# Patient Record
Sex: MALE | Race: WHITE | NOT HISPANIC OR LATINO
[De-identification: names, ages, dates, MRNs, and addresses within clinical notes are randomized per-mention and may not be internally consistent; named-entity substitution may affect disease eponyms.]

---

## 2017-01-26 ENCOUNTER — APPOINTMENT (OUTPATIENT)
Dept: CARDIOLOGY | Facility: CLINIC | Age: 62
End: 2017-01-26

## 2017-02-09 ENCOUNTER — APPOINTMENT (OUTPATIENT)
Dept: CARDIOLOGY | Facility: CLINIC | Age: 62
End: 2017-02-09

## 2017-02-16 ENCOUNTER — APPOINTMENT (OUTPATIENT)
Dept: CARDIOLOGY | Facility: CLINIC | Age: 62
End: 2017-02-16

## 2017-05-12 ENCOUNTER — APPOINTMENT (OUTPATIENT)
Dept: FAMILY MEDICINE | Facility: CLINIC | Age: 62
End: 2017-05-12

## 2017-05-12 VITALS
OXYGEN SATURATION: 98 % | HEART RATE: 80 BPM | RESPIRATION RATE: 16 BRPM | HEIGHT: 71 IN | DIASTOLIC BLOOD PRESSURE: 70 MMHG | TEMPERATURE: 98.5 F | SYSTOLIC BLOOD PRESSURE: 128 MMHG | WEIGHT: 195 LBS | BODY MASS INDEX: 27.3 KG/M2

## 2017-05-13 ENCOUNTER — TRANSCRIPTION ENCOUNTER (OUTPATIENT)
Age: 62
End: 2017-05-13

## 2017-05-15 LAB
ALBUMIN SERPL ELPH-MCNC: 4.8 G/DL
ALP BLD-CCNC: 79 U/L
ALT SERPL-CCNC: 20 U/L
ANION GAP SERPL CALC-SCNC: 15 MMOL/L
APPEARANCE: CLEAR
AST SERPL-CCNC: 20 U/L
BACTERIA: NEGATIVE
BASOPHILS # BLD AUTO: 0.03 K/UL
BASOPHILS NFR BLD AUTO: 0.5 %
BILIRUB SERPL-MCNC: 0.7 MG/DL
BILIRUBIN URINE: NEGATIVE
BLOOD URINE: NEGATIVE
BUN SERPL-MCNC: 22 MG/DL
CALCIUM SERPL-MCNC: 9.5 MG/DL
CHLORIDE SERPL-SCNC: 103 MMOL/L
CHOLEST SERPL-MCNC: 196 MG/DL
CHOLEST/HDLC SERPL: 3 RATIO
CO2 SERPL-SCNC: 25 MMOL/L
COLOR: YELLOW
CREAT SERPL-MCNC: 1.09 MG/DL
CREAT SPEC-SCNC: 103 MG/DL
EOSINOPHIL # BLD AUTO: 0.16 K/UL
EOSINOPHIL NFR BLD AUTO: 2.5 %
GLUCOSE QUALITATIVE U: NORMAL MG/DL
GLUCOSE SERPL-MCNC: 84 MG/DL
HBA1C MFR BLD HPLC: 4.4 %
HCT VFR BLD CALC: 43.7 %
HDLC SERPL-MCNC: 58 MG/DL
HEMOCCULT STL QL: NEGATIVE
HGB BLD-MCNC: 15.8 G/DL
IMM GRANULOCYTES NFR BLD AUTO: 0.2 %
KETONES URINE: NEGATIVE
LDLC SERPL CALC-MCNC: 112 MG/DL
LEUKOCYTE ESTERASE URINE: NEGATIVE
LYMPHOCYTES # BLD AUTO: 1.26 K/UL
LYMPHOCYTES NFR BLD AUTO: 19.8 %
MAN DIFF?: NORMAL
MCHC RBC-ENTMCNC: 32.4 PG
MCHC RBC-ENTMCNC: 36.2 GM/DL
MCV RBC AUTO: 89.7 FL
MICROALBUMIN 24H UR DL<=1MG/L-MCNC: <0.3 MG/DL
MICROALBUMIN/CREAT 24H UR-RTO: NORMAL
MICROSCOPIC-UA: NORMAL
MONOCYTES # BLD AUTO: 0.36 K/UL
MONOCYTES NFR BLD AUTO: 5.7 %
NEUTROPHILS # BLD AUTO: 4.54 K/UL
NEUTROPHILS NFR BLD AUTO: 71.3 %
NITRITE URINE: NEGATIVE
PH URINE: 7
PLATELET # BLD AUTO: 214 K/UL
POTASSIUM SERPL-SCNC: 4.1 MMOL/L
PROT SERPL-MCNC: 7.2 G/DL
PROTEIN URINE: NEGATIVE MG/DL
PSA SERPL-MCNC: 0.8 NG/ML
RBC # BLD: 4.87 M/UL
RBC # FLD: 14 %
RED BLOOD CELLS URINE: 3 /HPF
SODIUM SERPL-SCNC: 143 MMOL/L
SPECIFIC GRAVITY URINE: 1.02
SQUAMOUS EPITHELIAL CELLS: 0 /HPF
T4 FREE SERPL-MCNC: 1.2 NG/DL
TRIGL SERPL-MCNC: 130 MG/DL
TSH SERPL-ACNC: 2.02 UIU/ML
UROBILINOGEN URINE: NORMAL MG/DL
WBC # FLD AUTO: 6.36 K/UL
WHITE BLOOD CELLS URINE: 0 /HPF

## 2017-11-26 ENCOUNTER — EMERGENCY (EMERGENCY)
Facility: HOSPITAL | Age: 62
LOS: 1 days | Discharge: HOME | End: 2017-11-26
Admitting: EMERGENCY MEDICINE
Payer: COMMERCIAL

## 2017-11-26 PROCEDURE — 82550 ASSAY OF CK (CPK): CPT

## 2017-11-26 PROCEDURE — 80048 BASIC METABOLIC PNL TOTAL CA: CPT

## 2017-11-26 PROCEDURE — 36415 COLL VENOUS BLD VENIPUNCTURE: CPT

## 2017-11-26 PROCEDURE — 99283 EMERGENCY DEPT VISIT LOW MDM: CPT | Mod: 25

## 2017-11-26 PROCEDURE — 84484 ASSAY OF TROPONIN QUANT: CPT

## 2017-11-26 PROCEDURE — 93010 ELECTROCARDIOGRAM REPORT: CPT

## 2017-11-26 PROCEDURE — 82553 CREATINE MB FRACTION: CPT

## 2017-11-26 PROCEDURE — 85027 COMPLETE CBC AUTOMATED: CPT

## 2017-11-26 PROCEDURE — 99285 EMERGENCY DEPT VISIT HI MDM: CPT

## 2017-11-26 PROCEDURE — 93005 ELECTROCARDIOGRAM TRACING: CPT

## 2017-12-05 ENCOUNTER — APPOINTMENT (OUTPATIENT)
Dept: FAMILY MEDICINE | Facility: CLINIC | Age: 62
End: 2017-12-05
Payer: COMMERCIAL

## 2017-12-05 VITALS
HEART RATE: 72 BPM | HEIGHT: 71 IN | OXYGEN SATURATION: 98 % | SYSTOLIC BLOOD PRESSURE: 138 MMHG | DIASTOLIC BLOOD PRESSURE: 84 MMHG | BODY MASS INDEX: 27.3 KG/M2 | WEIGHT: 195 LBS

## 2017-12-05 DIAGNOSIS — G47.00 INSOMNIA, UNSPECIFIED: ICD-10-CM

## 2017-12-05 PROCEDURE — 99214 OFFICE O/P EST MOD 30 MIN: CPT

## 2018-03-18 ENCOUNTER — FORM ENCOUNTER (OUTPATIENT)
Age: 63
End: 2018-03-18

## 2018-03-19 ENCOUNTER — APPOINTMENT (OUTPATIENT)
Dept: RADIOLOGY | Facility: HOSPITAL | Age: 63
End: 2018-03-19
Payer: COMMERCIAL

## 2018-03-19 ENCOUNTER — OUTPATIENT (OUTPATIENT)
Dept: OUTPATIENT SERVICES | Facility: HOSPITAL | Age: 63
LOS: 1 days | End: 2018-03-19
Payer: COMMERCIAL

## 2018-03-19 ENCOUNTER — APPOINTMENT (OUTPATIENT)
Dept: FAMILY MEDICINE | Facility: CLINIC | Age: 63
End: 2018-03-19
Payer: COMMERCIAL

## 2018-03-19 VITALS
HEART RATE: 75 BPM | DIASTOLIC BLOOD PRESSURE: 82 MMHG | OXYGEN SATURATION: 97 % | HEIGHT: 71 IN | BODY MASS INDEX: 27.3 KG/M2 | SYSTOLIC BLOOD PRESSURE: 128 MMHG | WEIGHT: 195 LBS

## 2018-03-19 VITALS — RESPIRATION RATE: 14 BRPM

## 2018-03-19 VITALS — RESPIRATION RATE: 16 BRPM

## 2018-03-19 DIAGNOSIS — M79.606 PAIN IN LEG, UNSPECIFIED: ICD-10-CM

## 2018-03-19 DIAGNOSIS — M25.552 PAIN IN RIGHT HIP: ICD-10-CM

## 2018-03-19 DIAGNOSIS — M25.551 PAIN IN RIGHT HIP: ICD-10-CM

## 2018-03-19 DIAGNOSIS — M25.552 PAIN IN LEFT HIP: ICD-10-CM

## 2018-03-19 PROCEDURE — 73521 X-RAY EXAM HIPS BI 2 VIEWS: CPT

## 2018-03-19 PROCEDURE — 99213 OFFICE O/P EST LOW 20 MIN: CPT

## 2018-03-19 PROCEDURE — 73521 X-RAY EXAM HIPS BI 2 VIEWS: CPT | Mod: 26

## 2018-03-21 ENCOUNTER — MEDICATION RENEWAL (OUTPATIENT)
Age: 63
End: 2018-03-21

## 2018-05-13 ENCOUNTER — RX RENEWAL (OUTPATIENT)
Age: 63
End: 2018-05-13

## 2018-05-13 DIAGNOSIS — Z87.09 PERSONAL HISTORY OF OTHER DISEASES OF THE RESPIRATORY SYSTEM: ICD-10-CM

## 2019-04-08 ENCOUNTER — OTHER (OUTPATIENT)
Age: 64
End: 2019-04-08

## 2019-04-08 DIAGNOSIS — Z20.828 CONTACT WITH AND (SUSPECTED) EXPOSURE TO OTHER VIRAL COMMUNICABLE DISEASES: ICD-10-CM

## 2019-07-26 ENCOUNTER — TRANSCRIPTION ENCOUNTER (OUTPATIENT)
Age: 64
End: 2019-07-26

## 2019-09-02 ENCOUNTER — TRANSCRIPTION ENCOUNTER (OUTPATIENT)
Age: 64
End: 2019-09-02

## 2020-05-09 ENCOUNTER — APPOINTMENT (OUTPATIENT)
Dept: FAMILY MEDICINE | Facility: CLINIC | Age: 65
End: 2020-05-09

## 2020-12-16 PROBLEM — Z87.09 HISTORY OF ACUTE SINUSITIS: Status: RESOLVED | Noted: 2018-05-13 | Resolved: 2020-12-16

## 2021-06-22 ENCOUNTER — NON-APPOINTMENT (OUTPATIENT)
Age: 66
End: 2021-06-22

## 2021-08-26 ENCOUNTER — APPOINTMENT (OUTPATIENT)
Dept: FAMILY MEDICINE | Facility: CLINIC | Age: 66
End: 2021-08-26
Payer: MEDICARE

## 2021-08-26 ENCOUNTER — NON-APPOINTMENT (OUTPATIENT)
Age: 66
End: 2021-08-26

## 2021-08-26 VITALS
OXYGEN SATURATION: 98 % | SYSTOLIC BLOOD PRESSURE: 152 MMHG | WEIGHT: 205 LBS | TEMPERATURE: 97.1 F | DIASTOLIC BLOOD PRESSURE: 82 MMHG | BODY MASS INDEX: 28.7 KG/M2 | HEIGHT: 71 IN | HEART RATE: 77 BPM | RESPIRATION RATE: 14 BRPM

## 2021-08-26 DIAGNOSIS — Z12.11 ENCOUNTER FOR SCREENING FOR MALIGNANT NEOPLASM OF COLON: ICD-10-CM

## 2021-08-26 PROCEDURE — 36415 COLL VENOUS BLD VENIPUNCTURE: CPT

## 2021-08-26 PROCEDURE — 93000 ELECTROCARDIOGRAM COMPLETE: CPT | Mod: 59

## 2021-08-26 PROCEDURE — G0402 INITIAL PREVENTIVE EXAM: CPT

## 2021-08-26 NOTE — ASSESSMENT
[FreeTextEntry1] : Assessment and plan:\par \par 1.  Comprehensive annual wellness exam no acute findings.\par \par 2.  Electrocardiogram shows no acute ST-T wave changes no signs of ischemia when compared to previous stable.\par \par 3.  Comprehensive blood work drawn in office by examiner.\par \par 4.  Discussed with patient health maintenance issues patient is up-to-date with COVID-19 vaccination, referrals given for cardiology follow-up and for colon cancer screening colonoscopy fecal occult blood immunology will also be checked.\par \par 5.  Hypertension without diagnosis of hypertension recommend home blood pressure monitoring repeat blood pressure did drop to 140/80, discussed with patient the possibilities of starting medications to keep his blood pressure well controlled patient prefers conservative management lifestyle changes therefore I recommend weight loss program, increase physical activity as tolerated, low-sodium diet and maintain home blood pressure log.\par \par 6.  Vitiligo no changes stable.

## 2021-08-26 NOTE — HEALTH RISK ASSESSMENT
[Very Good] : ~his/her~  mood as very good [Yes] : Yes [2 - 3 times a week (3 pts)] : 2 - 3  times a week (3 points) [1 or 2 (0 pts)] : 1 or 2 (0 points) [Never (0 pts)] : Never (0 points) [No] : In the past 12 months have you used drugs other than those required for medical reasons? No [No falls in past year] : Patient reported no falls in the past year [0] : 2) Feeling down, depressed, or hopeless: Not at all (0) [Patient refused screening] : Patient refused screening [PHQ-2 Negative - No further assessment needed] : PHQ-2 Negative - No further assessment needed [Patient reported colonoscopy was normal] : Patient reported colonoscopy was normal [HIV test declined] : HIV test declined [None] : None [With Family] : lives with family [# of Members in Household ___] :  household currently consist of [unfilled] member(s) [Employed] : employed [College] : College [] :  [# Of Children ___] : has [unfilled] children [Sexually Active] : sexually active [Feels Safe at Home] : Feels safe at home [Fully functional (bathing, dressing, toileting, transferring, walking, feeding)] : Fully functional (bathing, dressing, toileting, transferring, walking, feeding) [Fully functional (using the telephone, shopping, preparing meals, housekeeping, doing laundry, using] : Fully functional and needs no help or supervision to perform IADLs (using the telephone, shopping, preparing meals, housekeeping, doing laundry, using transportation, managing medications and managing finances) [Reports normal functional visual acuity (ie: able to read med bottle)] : Reports normal functional visual acuity [Smoke Detector] : smoke detector [Carbon Monoxide Detector] : carbon monoxide detector [Safety elements used in home] : safety elements used in home [Seat Belt] :  uses seat belt [Sunscreen] : uses sunscreen [Reviewed no changes] : Reviewed, no changes [Designated Healthcare Proxy] : Designated healthcare proxy [Relationship: ___] : Relationship: [unfilled] [Aggressive treatment] : aggressive treatment [] : No [Audit-CScore] : 3 [VGC6Lltup] : 0 [Change in mental status noted] : No change in mental status noted [Language] : denies difficulty with language [Behavior] : denies difficulty with behavior [Learning/Retaining New Information] : denies difficulty learning/retaining new information [Handling Complex Tasks] : denies difficulty handling complex tasks [Reasoning] : denies difficulty with reasoning [Spatial Ability and Orientation] : denies difficulty with spatial ability and orientation [High Risk Behavior] : no high risk behavior [Reports changes in hearing] : Reports no changes in hearing [Reports changes in vision] : Reports no changes in vision [Reports changes in dental health] : Reports no changes in dental health [Travel to Developing Areas] : does not  travel to developing areas [TB Exposure] : is not being exposed to tuberculosis [Caregiver Concerns] : does not have caregiver concerns [ColonoscopyDate] : 04/15 [AdvancecareDate] : 08/21

## 2021-08-26 NOTE — PHYSICAL EXAM
[No Acute Distress] : no acute distress [Well Nourished] : well nourished [Well Developed] : well developed [Well-Appearing] : well-appearing [Normal Voice/Communication] : normal voice/communication [Normal Sclera/Conjunctiva] : normal sclera/conjunctiva [PERRL] : pupils equal round and reactive to light [EOMI] : extraocular movements intact [Normal Outer Ear/Nose] : the outer ears and nose were normal in appearance [Normal Oropharynx] : the oropharynx was normal [Normal TMs] : both tympanic membranes were normal [Normal Nasal Mucosa] : the nasal mucosa was normal [No JVD] : no jugular venous distention [No Lymphadenopathy] : no lymphadenopathy [Supple] : supple [Thyroid Normal, No Nodules] : the thyroid was normal and there were no nodules present [No Respiratory Distress] : no respiratory distress  [No Accessory Muscle Use] : no accessory muscle use [Clear to Auscultation] : lungs were clear to auscultation bilaterally [Normal Rate] : normal rate  [Regular Rhythm] : with a regular rhythm [Normal S1, S2] : normal S1 and S2 [No Murmur] : no murmur heard [No Carotid Bruits] : no carotid bruits [No Abdominal Bruit] : a ~M bruit was not heard ~T in the abdomen [No Varicosities] : no varicosities [Pedal Pulses Present] : the pedal pulses are present [No Edema] : there was no peripheral edema [No Palpable Aorta] : no palpable aorta [No Extremity Clubbing/Cyanosis] : no extremity clubbing/cyanosis [Normal Appearance] : normal in appearance [Soft] : abdomen soft [Non Tender] : non-tender [Non-distended] : non-distended [No Masses] : no abdominal mass palpated [No HSM] : no HSM [Normal Bowel Sounds] : normal bowel sounds [Normal Sphincter Tone] : normal sphincter tone [No Mass] : no mass [Urethral Meatus] : meatus normal [Penis Abnormality] : normal circumcised penis [Urinary Bladder Findings] : the bladder was normal on palpation [Scrotum] : the scrotum was normal [Rectal Exam - Seminal Vesicles] : the seminal vesicles were normal [Epididymis] : the epididymides were normal [Testes Tenderness] : no tenderness of the testes [Testes Mass (___cm)] : there were no testicular masses [Anus Abnormality] : the anus and perineum were normal [Rectal Exam - Rectum] : digital rectal exam was normal [Prostate Tenderness] : the prostate was not tender [No Prostate Nodules] : no prostate nodules [Normal Supraclavicular Nodes] : no supraclavicular lymphadenopathy [Normal Posterior Cervical Nodes] : no posterior cervical lymphadenopathy [Normal Anterior Cervical Nodes] : no anterior cervical lymphadenopathy [No CVA Tenderness] : no CVA  tenderness [No Spinal Tenderness] : no spinal tenderness [No Joint Swelling] : no joint swelling [Grossly Normal Strength/Tone] : grossly normal strength/tone [No Rash] : no rash [Coordination Grossly Intact] : coordination grossly intact [No Focal Deficits] : no focal deficits [Normal Gait] : normal gait [Deep Tendon Reflexes (DTR)] : deep tendon reflexes were 2+ and symmetric [Speech Grossly Normal] : speech grossly normal [Memory Grossly Normal] : memory grossly normal [Normal Affect] : the affect was normal [Alert and Oriented x3] : oriented to person, place, and time [Normal Mood] : the mood was normal [Normal Insight/Judgement] : insight and judgment were intact [Comprehensive Foot Exam Normal] : Right and left foot were examined and both feet are normal. No ulcers in either foot. Toes are normal and with full ROM.  Normal tactile sensation with monofilament testing throughout both feet [de-identified] : Vitiligo stable.

## 2021-08-26 NOTE — HISTORY OF PRESENT ILLNESS
[FreeTextEntry1] : See HPI. [de-identified] : Patient is a 66-year-old gentleman who presents today for annual wellness exam patient states that he is been in his usual state of health unfortunately last seen approximately 3 years ago.  Presently patient denies any chest pain, shortness of breath, nausea or vomiting.  We will be obtaining comprehensive blood work, electrocardiogram and will be discussing health maintenance issues.

## 2021-08-26 NOTE — COUNSELING
[Fall prevention counseling provided] : Fall prevention counseling provided [Adequate lighting] : Adequate lighting [No throw rugs] : No throw rugs [Use proper foot wear] : Use proper foot wear [Behavioral health counseling provided] : Behavioral health counseling provided [Sleep ___ hours/day] : Sleep [unfilled] hours/day [AUDIT-C Screening administered and reviewed] : AUDIT-C Screening administered and reviewed [Potential consequences of obesity discussed] : Potential consequences of obesity discussed [Benefits of weight loss discussed] : Benefits of weight loss discussed [Structured Weight Management Program suggested:] : Structured weight management program suggested [Encouraged to maintain food diary] : Encouraged to maintain food diary [Encouraged to increase physical activity] : Encouraged to increase physical activity [Encouraged to use exercise tracking device] : Encouraged to use exercise tracking device [Target Wt Loss Goal ___] : Weight Loss Goals: Target weight loss goal [unfilled] lbs [Decrease Portions] : decrease portions [Weigh Self Weekly] : weigh self weekly [____ min/wk Activity] : [unfilled] min/wk activity [Keep Food Diary] : keep food diary [None] : None [Good understanding] : Patient has a good understanding of lifestyle changes and steps needed to achieve self management goal

## 2021-08-30 LAB
ALBUMIN SERPL ELPH-MCNC: 4.7 G/DL
ALP BLD-CCNC: 79 U/L
ALT SERPL-CCNC: 15 U/L
ANION GAP SERPL CALC-SCNC: 14 MMOL/L
APPEARANCE: CLEAR
AST SERPL-CCNC: 18 U/L
BACTERIA: NEGATIVE
BASOPHILS # BLD AUTO: 0.04 K/UL
BASOPHILS NFR BLD AUTO: 0.6 %
BILIRUB SERPL-MCNC: 1.1 MG/DL
BILIRUBIN URINE: NEGATIVE
BLOOD URINE: NEGATIVE
BUN SERPL-MCNC: 21 MG/DL
CALCIUM SERPL-MCNC: 9.2 MG/DL
CHLORIDE SERPL-SCNC: 107 MMOL/L
CHOLEST SERPL-MCNC: 207 MG/DL
CO2 SERPL-SCNC: 22 MMOL/L
COLOR: YELLOW
COVID-19 NUCLEOCAPSID  GAM ANTIBODY INTERPRETATION: NEGATIVE
COVID-19 SPIKE DOMAIN ANTIBODY INTERPRETATION: POSITIVE
CREAT SERPL-MCNC: 1.05 MG/DL
EOSINOPHIL # BLD AUTO: 0.08 K/UL
EOSINOPHIL NFR BLD AUTO: 1.3 %
ESTIMATED AVERAGE GLUCOSE: 82 MG/DL
GLUCOSE QUALITATIVE U: NEGATIVE
GLUCOSE SERPL-MCNC: 97 MG/DL
HBA1C MFR BLD HPLC: 4.5 %
HCT VFR BLD CALC: 45.3 %
HCV AB SER QL: NONREACTIVE
HCV S/CO RATIO: 0.06 S/CO
HDLC SERPL-MCNC: 54 MG/DL
HGB BLD-MCNC: 15.4 G/DL
HYALINE CASTS: 0 /LPF
IMM GRANULOCYTES NFR BLD AUTO: 0.3 %
KETONES URINE: NEGATIVE
LDLC SERPL CALC-MCNC: 136 MG/DL
LEUKOCYTE ESTERASE URINE: NEGATIVE
LYMPHOCYTES # BLD AUTO: 1.05 K/UL
LYMPHOCYTES NFR BLD AUTO: 16.5 %
MAN DIFF?: NORMAL
MCHC RBC-ENTMCNC: 30.9 PG
MCHC RBC-ENTMCNC: 34 GM/DL
MCV RBC AUTO: 91 FL
MICROSCOPIC-UA: NORMAL
MONOCYTES # BLD AUTO: 0.45 K/UL
MONOCYTES NFR BLD AUTO: 7.1 %
NEUTROPHILS # BLD AUTO: 4.71 K/UL
NEUTROPHILS NFR BLD AUTO: 74.2 %
NITRITE URINE: NEGATIVE
NONHDLC SERPL-MCNC: 154 MG/DL
PH URINE: 6
PLATELET # BLD AUTO: 207 K/UL
POTASSIUM SERPL-SCNC: 4.1 MMOL/L
PROT SERPL-MCNC: 6.7 G/DL
PROTEIN URINE: NEGATIVE
PSA SERPL-MCNC: 0.87 NG/ML
RBC # BLD: 4.98 M/UL
RBC # FLD: 13.8 %
RED BLOOD CELLS URINE: 0 /HPF
SARS-COV-2 AB SERPL IA-ACNC: >250 U/ML
SARS-COV-2 AB SERPL QL IA: 0.08 INDEX
SODIUM SERPL-SCNC: 142 MMOL/L
SPECIFIC GRAVITY URINE: 1.02
SQUAMOUS EPITHELIAL CELLS: 0 /HPF
T4 FREE SERPL-MCNC: 1.1 NG/DL
TRIGL SERPL-MCNC: 89 MG/DL
UROBILINOGEN URINE: NORMAL
WBC # FLD AUTO: 6.35 K/UL
WHITE BLOOD CELLS URINE: 0 /HPF

## 2021-10-22 ENCOUNTER — TRANSCRIPTION ENCOUNTER (OUTPATIENT)
Age: 66
End: 2021-10-22

## 2021-10-23 ENCOUNTER — TRANSCRIPTION ENCOUNTER (OUTPATIENT)
Age: 66
End: 2021-10-23

## 2021-10-23 DIAGNOSIS — R03.0 ELEVATED BLOOD-PRESSURE READING, W/OUT DIAGNOSIS OF HYPERTENSION: ICD-10-CM

## 2021-10-27 ENCOUNTER — TRANSCRIPTION ENCOUNTER (OUTPATIENT)
Age: 66
End: 2021-10-27

## 2021-10-28 ENCOUNTER — NON-APPOINTMENT (OUTPATIENT)
Age: 66
End: 2021-10-28

## 2021-10-29 ENCOUNTER — APPOINTMENT (OUTPATIENT)
Dept: CARDIOLOGY | Facility: CLINIC | Age: 66
End: 2021-10-29
Payer: MEDICARE

## 2021-10-29 ENCOUNTER — NON-APPOINTMENT (OUTPATIENT)
Age: 66
End: 2021-10-29

## 2021-10-29 VITALS
HEIGHT: 71 IN | TEMPERATURE: 97.8 F | SYSTOLIC BLOOD PRESSURE: 128 MMHG | OXYGEN SATURATION: 97 % | BODY MASS INDEX: 28.42 KG/M2 | RESPIRATION RATE: 16 BRPM | HEART RATE: 70 BPM | WEIGHT: 203 LBS | DIASTOLIC BLOOD PRESSURE: 68 MMHG

## 2021-10-29 VITALS — DIASTOLIC BLOOD PRESSURE: 80 MMHG | SYSTOLIC BLOOD PRESSURE: 128 MMHG

## 2021-10-29 PROCEDURE — 93000 ELECTROCARDIOGRAM COMPLETE: CPT

## 2021-10-29 PROCEDURE — 93306 TTE W/DOPPLER COMPLETE: CPT

## 2021-10-29 PROCEDURE — 99204 OFFICE O/P NEW MOD 45 MIN: CPT

## 2021-10-29 NOTE — ASSESSMENT
[FreeTextEntry1] : Elevated blood pressure now reasonably well controlled.  Home blood pressure machine measures about 10 mm systolic higher than office.  Mild overweight.  Elevated cardiac risk.

## 2021-10-29 NOTE — HISTORY OF PRESENT ILLNESS
[FreeTextEntry1] : Patient has had very stressful situation at work blood pressures have been considerably elevated 160-180 systolic without symptoms of headache chest pain or shortness of breath.  Home blood pressure measurements were elevated and he was initiated on therapy which he is taking.  He is cutting salt in diet and starting to do a little walking.\par \par He has no personal prior cardiac history denies chest pain or dyspnea.

## 2021-10-29 NOTE — DISCUSSION/SUMMARY
[Patient] : the patient [Risks] : risks [Benefits] : benefits [Alternatives] : alternatives [FreeTextEntry1] : Detailed discussion with patient and wife.  Counseled on–diet low-sodium diet exercise weight loss.  Echo today to be arranged.  Treadmill test to assess blood pressure response to exercise and risk stratify also coronary calcium score recommended.  Follow-up after above for further discussion.  Questions addressed with patient and wife.  Blood work reviewed and discussed.

## 2021-11-10 ENCOUNTER — OUTPATIENT (OUTPATIENT)
Dept: OUTPATIENT SERVICES | Facility: HOSPITAL | Age: 66
LOS: 1 days | End: 2021-11-10
Payer: SELF-PAY

## 2021-11-10 ENCOUNTER — APPOINTMENT (OUTPATIENT)
Dept: CT IMAGING | Facility: CLINIC | Age: 66
End: 2021-11-10
Payer: SELF-PAY

## 2021-11-10 DIAGNOSIS — Z00.00 ENCOUNTER FOR GENERAL ADULT MEDICAL EXAMINATION WITHOUT ABNORMAL FINDINGS: ICD-10-CM

## 2021-11-10 PROCEDURE — 75571 CT HRT W/O DYE W/CA TEST: CPT | Mod: ME

## 2021-11-10 PROCEDURE — G1004: CPT

## 2021-11-10 PROCEDURE — 75571 CT HRT W/O DYE W/CA TEST: CPT | Mod: 26,ME

## 2021-11-12 ENCOUNTER — APPOINTMENT (OUTPATIENT)
Dept: CT IMAGING | Facility: CLINIC | Age: 66
End: 2021-11-12

## 2021-11-12 ENCOUNTER — APPOINTMENT (OUTPATIENT)
Dept: CARDIOLOGY | Facility: CLINIC | Age: 66
End: 2021-11-12
Payer: MEDICARE

## 2021-11-12 PROCEDURE — 93015 CV STRESS TEST SUPVJ I&R: CPT

## 2021-12-07 ENCOUNTER — APPOINTMENT (OUTPATIENT)
Dept: FAMILY MEDICINE | Facility: CLINIC | Age: 66
End: 2021-12-07

## 2021-12-22 DIAGNOSIS — B00.1 HERPESVIRAL VESICULAR DERMATITIS: ICD-10-CM

## 2021-12-27 ENCOUNTER — APPOINTMENT (OUTPATIENT)
Dept: CT IMAGING | Facility: CLINIC | Age: 66
End: 2021-12-27
Payer: MEDICARE

## 2021-12-27 ENCOUNTER — OUTPATIENT (OUTPATIENT)
Dept: OUTPATIENT SERVICES | Facility: HOSPITAL | Age: 66
LOS: 1 days | End: 2021-12-27
Payer: MEDICARE

## 2021-12-27 DIAGNOSIS — R93.89 ABNORMAL FINDINGS ON DIAGNOSTIC IMAGING OF OTHER SPECIFIED BODY STRUCTURES: ICD-10-CM

## 2021-12-27 PROCEDURE — G1004: CPT

## 2021-12-27 PROCEDURE — 71250 CT THORAX DX C-: CPT | Mod: ME

## 2021-12-27 PROCEDURE — 71250 CT THORAX DX C-: CPT | Mod: 26,ME

## 2022-01-01 ENCOUNTER — NON-APPOINTMENT (OUTPATIENT)
Age: 67
End: 2022-01-01

## 2022-01-11 ENCOUNTER — NON-APPOINTMENT (OUTPATIENT)
Age: 67
End: 2022-01-11

## 2022-01-12 ENCOUNTER — APPOINTMENT (OUTPATIENT)
Dept: FAMILY MEDICINE | Facility: CLINIC | Age: 67
End: 2022-01-12
Payer: MEDICARE

## 2022-01-12 VITALS
RESPIRATION RATE: 16 BRPM | OXYGEN SATURATION: 99 % | WEIGHT: 203 LBS | HEIGHT: 71 IN | SYSTOLIC BLOOD PRESSURE: 134 MMHG | DIASTOLIC BLOOD PRESSURE: 71 MMHG | BODY MASS INDEX: 28.42 KG/M2 | HEART RATE: 71 BPM | TEMPERATURE: 97.3 F

## 2022-01-12 DIAGNOSIS — Z01.812 ENCOUNTER FOR PREPROCEDURAL LABORATORY EXAMINATION: ICD-10-CM

## 2022-01-12 PROCEDURE — 99214 OFFICE O/P EST MOD 30 MIN: CPT

## 2022-01-12 NOTE — ASSESSMENT
[FreeTextEntry1] : Assessment and plan:\par \par 1.  Incidental finding lung nodules reviewed CT scan of chest for completeness sake I recommend pulmonology evaluation.  Most likely will repeat CT scan in 6 to 12 months to document stability.\par \par 2.  Cough physical exam was 100% within normal limits no acute findings it is only at night we could also consider the possibility of ACE inhibitor induced cough but usually that is fastidious and is present all day.  The cough basically lasts only minutes does not last all night and quickly resolves.\par \par 3.  Hypertension excellent blood pressure control with present medical management continue lisinopril with amlodipine and as stated earlier if cough worsens becomes fastidious I will discontinue lisinopril.\par \par 4.  Hyperlipidemia diet controlled low-fat low-cholesterol diet.\par \par Total time spent 35 minutes greater than 50% of that time was spent on counseling and coordination of care regarding lung nodules.

## 2022-01-12 NOTE — HISTORY OF PRESENT ILLNESS
[FreeTextEntry1] : See HPI [de-identified] : Patient is a 66-year-old gentleman who presents today for follow-up and disease management recently incidental finding lung nodules status post CT angio of chest for calcium score repeat CT scan of chest shows very small basically insignificant type nodules.  Patient complains of cough only at night presently denies any chest pain, shortness of breath, nausea or vomiting recently had comprehensive cardiac work-up which was absolutely normal.  Medical history is also significant for hypertension and mild hyperlipidemia which is diet controlled.

## 2022-01-12 NOTE — HEALTH RISK ASSESSMENT
[Never] : Never [Yes] : Yes [2 - 4 times a month (2 pts)] : 2-4 times a month (2 points) [1 or 2 (0 pts)] : 1 or 2 (0 points) [Never (0 pts)] : Never (0 points) [No] : In the past 12 months have you used drugs other than those required for medical reasons? No [No falls in past year] : Patient reported no falls in the past year [0] : 2) Feeling down, depressed, or hopeless: Not at all (0) [PHQ-2 Negative - No further assessment needed] : PHQ-2 Negative - No further assessment needed [Audit-CScore] : 2 [[id number]] : 0 [With Patient/Caregiver] : , with patient/caregiver [Reviewed no changes] : Reviewed, no changes [Designated Healthcare Proxy] : Designated healthcare proxy [Relationship: ___] : Relationship: [unfilled] [Aggressive treatment] : aggressive treatment [I will adhere to the patient's wishes.] : I will adhere to the patient's wishes. [AdvancecareDate] : 01/22

## 2022-01-12 NOTE — REVIEW OF SYSTEMS
[Chest Pain] : no chest pain [Palpitations] : no palpitations [Claudication] : no  leg claudication [Lower Ext Edema] : no lower extremity edema [Orthopena] : no orthopnea [Paroxysmal Nocturnal Dyspnea] : no paroxysmal nocturnal dyspnea [Shortness Of Breath] : no shortness of breath [Wheezing] : no wheezing [Cough] : cough [Dyspnea on Exertion] : not dyspnea on exertion [Negative] : Heme/Lymph

## 2022-01-30 ENCOUNTER — TRANSCRIPTION ENCOUNTER (OUTPATIENT)
Age: 67
End: 2022-01-30

## 2022-01-31 ENCOUNTER — APPOINTMENT (OUTPATIENT)
Dept: FAMILY MEDICINE | Facility: CLINIC | Age: 67
End: 2022-01-31
Payer: MEDICARE

## 2022-01-31 DIAGNOSIS — Z11.59 ENCOUNTER FOR SCREENING FOR OTHER VIRAL DISEASES: ICD-10-CM

## 2022-01-31 DIAGNOSIS — R05.8 OTHER SPECIFIED COUGH: ICD-10-CM

## 2022-01-31 PROCEDURE — 99215 OFFICE O/P EST HI 40 MIN: CPT | Mod: 95

## 2022-01-31 RX ORDER — LISINOPRIL 10 MG/1
10 TABLET ORAL
Qty: 30 | Refills: 3 | Status: DISCONTINUED | COMMUNITY
Start: 2021-10-27 | End: 2022-01-31

## 2022-01-31 NOTE — REVIEW OF SYSTEMS
[Fever] : fever [Cough] : cough [Negative] : Heme/Lymph [Shortness Of Breath] : no shortness of breath [Wheezing] : no wheezing [Dyspnea on Exertion] : not dyspnea on exertion [FreeTextEntry2] : subjective fever

## 2022-01-31 NOTE — ASSESSMENT
[FreeTextEntry1] : continue with symptomatic management, plenty of rest, warm fluids, hand washing, salt water gargle, for now, Flonase, benzonatate,  tylenol for fever, if symptoms worsen, new onset, fever, chills, return to office for re-evaluation, may consider getting COVID-19 PCR swab tested. added COVID-19 PCR swab today\par follow up with pulmonary accordingly\par Held Lisinopril for now, titrate dose of Amlodipine to 10 mg daily, keep home bp logs\par RTO for follow up and bp check with PMD.\par Greater than 55 min of face to face time, reviewed chart, labwork, addressed various health concerns, ordered necessary labs, imaging, referral for follow up. Answered all pt questions, coordinated care for patient.\par \par

## 2022-01-31 NOTE — PHYSICAL EXAM
[No Acute Distress] : no acute distress [Well Nourished] : well nourished [EOMI] : extraocular movements intact [Normal Outer Ear/Nose] : the outer ears and nose were normal in appearance [No Respiratory Distress] : no respiratory distress  [No Edema] : there was no peripheral edema [Non Tender] : non-tender [No Rash] : no rash [Normal Gait] : normal gait [Normal Affect] : the affect was normal
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
## 2022-02-01 ENCOUNTER — TRANSCRIPTION ENCOUNTER (OUTPATIENT)
Age: 67
End: 2022-02-01

## 2022-02-01 LAB — SARS-COV-2 N GENE NPH QL NAA+PROBE: DETECTED

## 2022-02-08 ENCOUNTER — APPOINTMENT (OUTPATIENT)
Dept: FAMILY MEDICINE | Facility: CLINIC | Age: 67
End: 2022-02-08
Payer: MEDICARE

## 2022-02-08 VITALS
RESPIRATION RATE: 15 BRPM | HEART RATE: 88 BPM | BODY MASS INDEX: 28.38 KG/M2 | HEIGHT: 71.3 IN | OXYGEN SATURATION: 97 % | WEIGHT: 205 LBS | TEMPERATURE: 97.3 F

## 2022-02-08 VITALS — SYSTOLIC BLOOD PRESSURE: 150 MMHG | DIASTOLIC BLOOD PRESSURE: 80 MMHG

## 2022-02-08 DIAGNOSIS — R09.81 NASAL CONGESTION: ICD-10-CM

## 2022-02-08 PROCEDURE — 99214 OFFICE O/P EST MOD 30 MIN: CPT | Mod: CS

## 2022-02-08 RX ORDER — ALPRAZOLAM 0.25 MG/1
0.25 TABLET ORAL
Qty: 20 | Refills: 0 | Status: DISCONTINUED | COMMUNITY
Start: 2017-12-05 | End: 2022-02-08

## 2022-02-08 NOTE — REVIEW OF SYSTEMS
[Nasal Discharge] : nasal discharge [Negative] : Heme/Lymph [Fever] : no fever [Shortness Of Breath] : no shortness of breath [Wheezing] : no wheezing [Cough] : no cough [Dyspnea on Exertion] : not dyspnea on exertion [FreeTextEntry4] : nasal congestion.

## 2022-02-08 NOTE — ASSESSMENT
[FreeTextEntry1] : symptomatically improved\par bp slightly elevated, recommended resume back Lisinopril at 5 daily, c/w Amlodipine 10mg \par c/w warm hydration, warm humidified air, will add fluticasone daily\par C/w with bp logs at home\par RTO in July 2022 for CPE visit.

## 2022-02-11 ENCOUNTER — RX RENEWAL (OUTPATIENT)
Age: 67
End: 2022-02-11

## 2022-03-07 ENCOUNTER — RX RENEWAL (OUTPATIENT)
Age: 67
End: 2022-03-07

## 2022-03-08 ENCOUNTER — RX RENEWAL (OUTPATIENT)
Age: 67
End: 2022-03-08

## 2022-03-21 ENCOUNTER — APPOINTMENT (OUTPATIENT)
Dept: PULMONOLOGY | Facility: CLINIC | Age: 67
End: 2022-03-21
Payer: MEDICARE

## 2022-03-21 ENCOUNTER — LABORATORY RESULT (OUTPATIENT)
Age: 67
End: 2022-03-21

## 2022-03-21 VITALS
SYSTOLIC BLOOD PRESSURE: 131 MMHG | DIASTOLIC BLOOD PRESSURE: 70 MMHG | WEIGHT: 206 LBS | OXYGEN SATURATION: 98 % | HEIGHT: 71 IN | RESPIRATION RATE: 16 BRPM | HEART RATE: 72 BPM | BODY MASS INDEX: 28.84 KG/M2 | TEMPERATURE: 97.8 F

## 2022-03-21 DIAGNOSIS — Z83.3 FAMILY HISTORY OF DIABETES MELLITUS: ICD-10-CM

## 2022-03-21 DIAGNOSIS — Z82.49 FAMILY HISTORY OF ISCHEMIC HEART DISEASE AND OTHER DISEASES OF THE CIRCULATORY SYSTEM: ICD-10-CM

## 2022-03-21 DIAGNOSIS — Z82.62 FAMILY HISTORY OF OSTEOPOROSIS: ICD-10-CM

## 2022-03-21 DIAGNOSIS — U07.1 COVID-19: ICD-10-CM

## 2022-03-21 DIAGNOSIS — Z86.19 PERSONAL HISTORY OF OTHER INFECTIOUS AND PARASITIC DISEASES: ICD-10-CM

## 2022-03-21 DIAGNOSIS — Z80.6 FAMILY HISTORY OF LEUKEMIA: ICD-10-CM

## 2022-03-21 LAB
24R-OH-CALCIDIOL SERPL-MCNC: 26 PG/ML
25(OH)D3 SERPL-MCNC: 33 NG/ML
BASOPHILS # BLD AUTO: 0.05 K/UL
BASOPHILS NFR BLD AUTO: 0.8 %
EOSINOPHIL # BLD AUTO: 0.12 K/UL
EOSINOPHIL NFR BLD AUTO: 1.9 %
HCT VFR BLD CALC: 40.7 %
HGB BLD-MCNC: 14.5 G/DL
IMM GRANULOCYTES NFR BLD AUTO: 0.3 %
LYMPHOCYTES # BLD AUTO: 1.19 K/UL
LYMPHOCYTES NFR BLD AUTO: 18.4 %
MAN DIFF?: NORMAL
MCHC RBC-ENTMCNC: 31.9 PG
MCHC RBC-ENTMCNC: 35.6 GM/DL
MCV RBC AUTO: 89.6 FL
MONOCYTES # BLD AUTO: 0.43 K/UL
MONOCYTES NFR BLD AUTO: 6.7 %
NEUTROPHILS # BLD AUTO: 4.64 K/UL
NEUTROPHILS NFR BLD AUTO: 71.9 %
PLATELET # BLD AUTO: 239 K/UL
RBC # BLD: 4.54 M/UL
RBC # FLD: 12.9 %
WBC # FLD AUTO: 6.45 K/UL

## 2022-03-21 PROCEDURE — ZZZZZ: CPT

## 2022-03-21 PROCEDURE — 99204 OFFICE O/P NEW MOD 45 MIN: CPT | Mod: 25

## 2022-03-21 PROCEDURE — 94010 BREATHING CAPACITY TEST: CPT

## 2022-03-21 PROCEDURE — 94727 GAS DIL/WSHOT DETER LNG VOL: CPT

## 2022-03-21 PROCEDURE — 94729 DIFFUSING CAPACITY: CPT

## 2022-03-21 PROCEDURE — 95012 NITRIC OXIDE EXP GAS DETER: CPT

## 2022-03-21 PROCEDURE — 71046 X-RAY EXAM CHEST 2 VIEWS: CPT

## 2022-03-21 RX ORDER — AMOXICILLIN AND CLAVULANATE POTASSIUM 875; 125 MG/1; MG/1
875-125 TABLET, COATED ORAL
Qty: 20 | Refills: 0 | Status: DISCONTINUED | COMMUNITY
Start: 2018-05-13 | End: 2022-03-21

## 2022-03-21 RX ORDER — NYSTATIN 100000 [USP'U]/G
100000 CREAM TOPICAL 3 TIMES DAILY
Qty: 60 | Refills: 3 | Status: DISCONTINUED | COMMUNITY
Start: 2017-05-12 | End: 2022-03-21

## 2022-03-21 RX ORDER — TRIAMCINOLONE ACETONIDE 1 MG/G
0.1 CREAM TOPICAL 3 TIMES DAILY
Qty: 80 | Refills: 3 | Status: DISCONTINUED | COMMUNITY
Start: 2017-05-12 | End: 2022-03-21

## 2022-03-21 RX ORDER — OSELTAMIVIR PHOSPHATE 75 MG/1
75 CAPSULE ORAL
Qty: 10 | Refills: 0 | Status: DISCONTINUED | COMMUNITY
Start: 2019-04-08 | End: 2022-03-21

## 2022-03-21 RX ORDER — PREDNISONE 10 MG/1
10 TABLET ORAL
Qty: 15 | Refills: 0 | Status: DISCONTINUED | COMMUNITY
Start: 2018-03-21 | End: 2022-03-21

## 2022-03-21 NOTE — REASON FOR VISIT
[Initial] : an initial visit [TextBox_44] : chronic cough, abnormal CT of the chest, likely RADs proceeding covid 19 , likely ASAD \par

## 2022-03-21 NOTE — ADDENDUM
[FreeTextEntry1] : Documented by Ismael Gavin acting as a scribe for Dr. Jono Zambrano on 03/21/2022 \par \par All medical record entries made by the Scribe were at my, Dr. Jono Zambrano's, direction and personally dictated by me on 03/21/2022 . I have reviewed the chart and agree that the record accurately reflects my personal performance of the history, physical exam, assessment and plan. I have also personally directed, reviewed, and agree with the discharge instructions

## 2022-03-21 NOTE — HISTORY OF PRESENT ILLNESS
[TextBox_4] : Mr. HOOKS is a 66 year old male with a history of never smoking, HLD, HTN, cervical adenopathy, nonrheumatic mitral regurgitation, abnormal CT of chest, Covid 19 1/2022, anxiety, low vitamin D, chicken pox, measles, presenting to the office today for initial pulmonary evaluation. His chief complaint is\par \par -he denies SOB at rest \par -he notes SOB on extreme exertion \par -he constant dry non productive cough at night before dinner onset many months \par -he notes cough is exacerbated by cold air \par -he notes on HTN medication onset 11/2021 due to stress from work \par -he notes BP was 180/101 prior to getting medication \par -he notes on amlodipine and lisinopril \par -he notes increased dosage of amlodipine and decreased dosage of lisinopril when infected with Covid 19 \par -he notes cough is improved when drinking water \par -he notes given benzonatate but symptoms president\par -he notes constant globus sensation due to PND and slight deviated septum  \par -he denies any known allergies\par -he notes snoring \par -he notes feeling rested when sleeping without interruptions \par -he notes memory and concentration is stable \par \par -he denies any headaches, nausea, vomiting, fever, chills, sweats, chest pain, chest pressure, diarrhea, constipation, dysphagia, dizziness, leg swelling, leg pain, itchy eyes, itchy ears, heartburn, reflux, sour taste in the mouth, myalgias or arthralgias.

## 2022-03-21 NOTE — PROCEDURE
[FreeTextEntry1] : CXR reveals a normal sized heart; no evidence of infiltrate or effusion--a normal appearing chest radiograph\par \par Full PFT revealed normal flows, with a FEV1 of 3.47L,which is 98% of predicted,  normal lung volumes, and normal diffusion of 30.2 , which is 125% of predicted with a normal flow volume loop \par \par FENO was 33 ; a normal value being less than 25\par Fractional exhaled nitric oxide (FENO) is regarded as a simple, noninvasive method for assessing eosinophilic airway inflammation. Produced by a variety of cells within the lung, nitric oxide (NO) concentrations are generally low in healthy individuals. However, high concentrations of NO appear to be involved in nonspecific host defense mechanisms and chronic inflammatory diseases such as asthma. The American Thoracic Society (ATS) therefore has recommended using FENO to aid in the diagnosis and monitoring of eosinophilic airway inflammation and asthma, and for identifying steroid responsive individuals whose chronic respiratory symptoms may be caused by airway inflammation. \par \par CT chest (12.27.2021) revealed Few very small calcified and noncalcified nodules are noted within both lungs. Few of those were imaged on the previous examination and are unchanged.\par \par CT Heart Calcium Score (11.10.2021) revealed 1. The calculated Agatston score is 0. MOBLEY percentile: N/A

## 2022-03-21 NOTE — ASSESSMENT
[FreeTextEntry1] : Mr. HOOKS is a 66 year old male with a history of never smoking HLD, HTN, cervical adenopathy, nonrheumatic mitral regurgitation, abnormal CT of chest, Covid 19 1/2022, anxiety, low vitamin D, chicken pox, measles, presenting to the office today for initial pulmonary evaluation for chronic cough, abnormal CT of the chest, likely RADs proceeding covid 19 , likely ASAD \par \par \par His shortness of breath is multifactorial due to:\par -poor mechanics of breathing \par -out of shape / overweight\par -pulmonary disease\par    -mild asthma \par -?cardiac disease \par \par His chronic cough is felt to be multifactorial due to:\par -Asthma / COPD\par -Post nasal drip syndrome\par -GERD (doubting) \par -?medication (doubting lisinopril) \par \par problem 1: mild asthma \par -add Breo Ellipta 200 at 1 inhalation QHS QD \par -Asthma is  believed to be caused by inherited (genetic) and environmental factor, but its exact cause is unknown. Asthma may be triggered by allergens, lung infections, or irritants in the air. Asthma triggers are different for each person \par -Inhaler technique reviewed as well as oral hygiene techniques reviewed with patient. Avoidance of cold air, extremes of temperature, rescue inhaler should be used before exercise. Order of medication reviewed with patient. Recommended use of a cool mist humidifier in the bedroom. \par \par problem 2:allergies/ PND\par -add Olopatadine 0.6% 1 sniff BID \par -get Blood work to include: asthma panel, food IgE panel, IgE level, eosinophil level, vitamin D level \par -Environmental measures for allergies were encouraged including mattress and pillow covers, air purifier, and environmental controls. \par \par problem 3: Abnormal PFTs (elevated FENO 33 c/w inflammation) \par \par Problem 4: Abnormal CT of chest- c/w post inflammatory or infectious rather than malignant.-small nodules \par -?hypersensitivity pneumonitis\par -?TB\par -?sarcoidosis, \par -complete f/u CT 6/2022\par -compel HP panel, quantiferon gold, ACE level \par CAT scans are the only radiological modality to identify abnormalities w/in the lungs with regards to nodules/masses/lymph nodes. Risks, benefits were reviewed in detail. The guidelines for abnormalities include follow up CT scans at various intervals which could range from 6 weeks to 1 year intervals. If there is a change for the worse then consideration for a biopsy will be considered if you are a candidate. Second opinion evaluation with a thoracic surgeon or an interventional radiologist could be offered. \par \par Problem 5 r/o ASAD (snoring, EDS, Elevated mallampati class, neck size) \par -complete home sleep study \par Sleep apnea is associated with adverse clinical consequences which can affect most organ systems.  Cardiovascular disease risk includes arrhythmias, atrial fibrillation, hypertension, coronary artery disease, and stroke. Metabolic disorders include diabetes type 2, non-alcoholic fatty liver disease. Mood disorder especially depression; and cognitive decline especially in the elderly. Associations with  chronic reflux/Kurtz’s esophagus some but not all inclusive. \par -Reasons  include arousal consistent with hypopnea; respiratory events most prominent in REM sleep or supine \par position; therefore sleep staging and body position are important for accurate diagnosis and estimation of AHI. \par \par Problem 6 s/p Covid 19 1/2022\par -s/p Covid 19 vaccine x3 \par \par problem 7: cardiac disease\par -recommended to continue to follow up with Cardiologist \par \par problem 8: poor breathing mechanics\par -Proper breathing techniques were reviewed with an emphasis of exhalation. Patient instructed to breath in for 1 second and out for four seconds. Patient was encouraged to not talk while walking. \par \par problem 9: out of shape / overweight\par -Weight loss, exercise, and diet control were discussed and are highly encouraged. Treatment options were given such as, aqua therapy, and contacting a nutritionist. Recommended to use the elliptical, stationary bike, less use of treadmill. Mindful eating was explained to the patient Obesity is associated with worsening asthma, shortness of breath, and potential for cardiac disease, diabetes, and other underlying medical conditions\par \par problem 10: health maintenance \par -recommended yearly flu shot after October 15\par -recommended strep pneumonia vaccines: Prevnar-13 vaccine, followed by Pneumo vaccine 23 one year following after 65\par -recommended early intervention for Upper Respiratory Infections (URIs)\par -recommended regular osteoporosis evaluations\par -recommended early dermatological evaluations\par -recommended after the age of 50 to the age of 70, colonoscopy every 5 years\par \par F/U in 6-8 weeks.\par He is encouraged to call with any changes, concerns, or questions\par

## 2022-03-22 LAB — ACE BLD-CCNC: 14 U/L

## 2022-03-23 LAB
A ALTERNATA IGE QN: <0.1 KUA/L
A ALTERNATA IGE QN: <0.1 KUA/L
A FUMIGATUS IGE QN: <0.1 KUA/L
A FUMIGATUS IGE QN: <0.1 KUA/L
BERMUDA GRASS IGE QN: <0.1 KUA/L
BOXELDER IGE QN: <0.1 KUA/L
C ALBICANS IGE QN: <0.1 KUA/L
C HERBARUM IGE QN: <0.1 KUA/L
C HERBARUM IGE QN: <0.1 KUA/L
CALIF WALNUT IGE QN: <0.1 KUA/L
CAT DANDER IGE QN: <0.1 KUA/L
CAT DANDER IGE QN: <0.1 KUA/L
CLAM IGE QN: <0.1 KUA/L
CMN PIGWEED IGE QN: <0.1 KUA/L
CODFISH IGE QN: <0.1 KUA/L
COMMON RAGWEED IGE QN: <0.1 KUA/L
COMMON RAGWEED IGE QN: <0.1 KUA/L
CORN IGE QN: <0.1 KUA/L
COTTONWOOD IGE QN: <0.1 KUA/L
COW MILK IGE QN: <0.1 KUA/L
D FARINAE IGE QN: <0.1 KUA/L
D FARINAE IGE QN: <0.1 KUA/L
D PTERONYSS IGE QN: <0.1 KUA/L
D PTERONYSS IGE QN: <0.1 KUA/L
DEPRECATED A ALTERNATA IGE RAST QL: 0
DEPRECATED A ALTERNATA IGE RAST QL: 0
DEPRECATED A FUMIGATUS IGE RAST QL: 0
DEPRECATED A FUMIGATUS IGE RAST QL: 0
DEPRECATED BERMUDA GRASS IGE RAST QL: 0
DEPRECATED BOXELDER IGE RAST QL: 0
DEPRECATED C ALBICANS IGE RAST QL: 0
DEPRECATED C HERBARUM IGE RAST QL: 0
DEPRECATED C HERBARUM IGE RAST QL: 0
DEPRECATED CAT DANDER IGE RAST QL: 0
DEPRECATED CAT DANDER IGE RAST QL: 0
DEPRECATED CLAM IGE RAST QL: 0
DEPRECATED CODFISH IGE RAST QL: 0
DEPRECATED COMMON PIGWEED IGE RAST QL: 0
DEPRECATED COMMON RAGWEED IGE RAST QL: 0
DEPRECATED COMMON RAGWEED IGE RAST QL: 0
DEPRECATED CORN IGE RAST QL: 0
DEPRECATED COTTONWOOD IGE RAST QL: 0
DEPRECATED COW MILK IGE RAST QL: 0
DEPRECATED D FARINAE IGE RAST QL: 0
DEPRECATED D FARINAE IGE RAST QL: 0
DEPRECATED D PTERONYSS IGE RAST QL: 0
DEPRECATED D PTERONYSS IGE RAST QL: 0
DEPRECATED DOG DANDER IGE RAST QL: 0
DEPRECATED DOG DANDER IGE RAST QL: 0
DEPRECATED DUCK FEATHER IGE RAST QL: 0
DEPRECATED EGG WHITE IGE RAST QL: 0
DEPRECATED GOOSE FEATHER IGE RAST QL: 0
DEPRECATED GOOSEFOOT IGE RAST QL: 0
DEPRECATED LONDON PLANE IGE RAST QL: 0
DEPRECATED M RACEMOSUS IGE RAST QL: 0
DEPRECATED MOUSE URINE PROT IGE RAST QL: 0
DEPRECATED MUGWORT IGE RAST QL: 0
DEPRECATED P NOTATUM IGE RAST QL: 0
DEPRECATED PEANUT IGE RAST QL: 0
DEPRECATED RED CEDAR IGE RAST QL: 0
DEPRECATED ROACH IGE RAST QL: 0
DEPRECATED ROACH IGE RAST QL: 0
DEPRECATED SCALLOP IGE RAST QL: <0.1 KUA/L
DEPRECATED SESAME SEED IGE RAST QL: 0
DEPRECATED SHEEP SORREL IGE RAST QL: 0
DEPRECATED SHRIMP IGE RAST QL: 0
DEPRECATED SILVER BIRCH IGE RAST QL: 0
DEPRECATED SOYBEAN IGE RAST QL: 0
DEPRECATED TIMOTHY IGE RAST QL: 0
DEPRECATED TIMOTHY IGE RAST QL: 0
DEPRECATED WALNUT IGE RAST QL: 0
DEPRECATED WHEAT IGE RAST QL: 0
DEPRECATED WHITE ASH IGE RAST QL: 0
DEPRECATED WHITE OAK IGE RAST QL: 0
DEPRECATED WHITE OAK IGE RAST QL: 0
DOG DANDER IGE QN: <0.1 KUA/L
DOG DANDER IGE QN: <0.1 KUA/L
DUCK FEATHER IGE QN: <0.1 KUA/L
EGG WHITE IGE QN: <0.1 KUA/L
GOOSE FEATHER IGE QN: <0.1 KUA/L
GOOSEFOOT IGE QN: <0.1 KUA/L
LONDON PLANE IGE QN: <0.1 KUA/L
M RACEMOSUS IGE QN: <0.1 KUA/L
M TB IFN-G BLD-IMP: NEGATIVE
MOUSE URINE PROT IGE QN: <0.1 KUA/L
MUGWORT IGE QN: <0.1 KUA/L
MULBERRY (T70) CLASS: 0
MULBERRY (T70) CONC: <0.1 KUA/L
P NOTATUM IGE QN: <0.1 KUA/L
PEANUT IGE QN: <0.1 KUA/L
QUANTIFERON TB PLUS MITOGEN MINUS NIL: 4.78 IU/ML
QUANTIFERON TB PLUS NIL: 0.02 IU/ML
QUANTIFERON TB PLUS TB1 MINUS NIL: 0.02 IU/ML
QUANTIFERON TB PLUS TB2 MINUS NIL: 0.02 IU/ML
RED CEDAR IGE QN: <0.1 KUA/L
ROACH IGE QN: <0.1 KUA/L
ROACH IGE QN: <0.1 KUA/L
SCALLOP IGE QN: 0
SCALLOP IGE QN: <0.1 KUA/L
SESAME SEED IGE QN: <0.1 KUA/L
SHEEP SORREL IGE QN: <0.1 KUA/L
SILVER BIRCH IGE QN: <0.1 KUA/L
SOYBEAN IGE QN: <0.1 KUA/L
TIMOTHY IGE QN: <0.1 KUA/L
TIMOTHY IGE QN: <0.1 KUA/L
TOTAL IGE SMQN RAST: 333 KU/L
TREE ALLERG MIX1 IGE QL: 0
WALNUT IGE QN: <0.1 KUA/L
WHEAT IGE QN: <0.1 KUA/L
WHITE ASH IGE QN: <0.1 KUA/L
WHITE ELM IGE QN: 0
WHITE ELM IGE QN: <0.1 KUA/L
WHITE OAK IGE QN: <0.1 KUA/L
WHITE OAK IGE QN: <0.1 KUA/L

## 2022-03-24 LAB
ALTERN TENCAPG(M6): <2 MCG/ML
ASPER FUMCAPG(M3): 4.8 MCG/ML
AUREOBASCAPG(M12): 2.9 MCG/ML
MICROPOLYCAPG(M22): <2 MCG/ML
PENIC CHRYCAPG(M1): 4.1 MCG/ML
PHOMA BETAE IGG: 2.9 MCG/ML
THERMOCAPG(M23): NORMAL MCG/ML
TRICHODERMA VIRIDE IGG: <2 MCG/ML

## 2022-03-30 ENCOUNTER — NON-APPOINTMENT (OUTPATIENT)
Age: 67
End: 2022-03-30

## 2022-04-04 ENCOUNTER — RX RENEWAL (OUTPATIENT)
Age: 67
End: 2022-04-04

## 2022-04-29 ENCOUNTER — NON-APPOINTMENT (OUTPATIENT)
Age: 67
End: 2022-04-29

## 2022-05-03 ENCOUNTER — EMERGENCY (EMERGENCY)
Facility: HOSPITAL | Age: 67
LOS: 1 days | Discharge: HOME | End: 2022-05-03
Attending: STUDENT IN AN ORGANIZED HEALTH CARE EDUCATION/TRAINING PROGRAM | Admitting: STUDENT IN AN ORGANIZED HEALTH CARE EDUCATION/TRAINING PROGRAM
Payer: MEDICARE

## 2022-05-03 VITALS
HEART RATE: 75 BPM | OXYGEN SATURATION: 97 % | DIASTOLIC BLOOD PRESSURE: 73 MMHG | SYSTOLIC BLOOD PRESSURE: 141 MMHG | RESPIRATION RATE: 18 BRPM

## 2022-05-03 VITALS
WEIGHT: 205.91 LBS | HEART RATE: 70 BPM | SYSTOLIC BLOOD PRESSURE: 152 MMHG | RESPIRATION RATE: 18 BRPM | TEMPERATURE: 98 F | HEIGHT: 71 IN | OXYGEN SATURATION: 97 % | DIASTOLIC BLOOD PRESSURE: 79 MMHG

## 2022-05-03 LAB
ALBUMIN SERPL ELPH-MCNC: 3.7 G/DL — SIGNIFICANT CHANGE UP (ref 3.3–5)
ALP SERPL-CCNC: 90 U/L — SIGNIFICANT CHANGE UP (ref 40–120)
ALT FLD-CCNC: 26 U/L — SIGNIFICANT CHANGE UP (ref 10–45)
ANION GAP SERPL CALC-SCNC: 10 MMOL/L — SIGNIFICANT CHANGE UP (ref 5–17)
AST SERPL-CCNC: 20 U/L — SIGNIFICANT CHANGE UP (ref 10–40)
BASOPHILS # BLD AUTO: 0.05 K/UL — SIGNIFICANT CHANGE UP (ref 0–0.2)
BASOPHILS NFR BLD AUTO: 0.6 % — SIGNIFICANT CHANGE UP (ref 0–2)
BILIRUB SERPL-MCNC: 0.9 MG/DL — SIGNIFICANT CHANGE UP (ref 0.2–1.2)
BUN SERPL-MCNC: 18 MG/DL — SIGNIFICANT CHANGE UP (ref 7–23)
CALCIUM SERPL-MCNC: 8.7 MG/DL — SIGNIFICANT CHANGE UP (ref 8.4–10.5)
CHLORIDE SERPL-SCNC: 106 MMOL/L — SIGNIFICANT CHANGE UP (ref 96–108)
CO2 SERPL-SCNC: 27 MMOL/L — SIGNIFICANT CHANGE UP (ref 22–31)
CREAT SERPL-MCNC: 1.15 MG/DL — SIGNIFICANT CHANGE UP (ref 0.5–1.3)
EGFR: 71 ML/MIN/1.73M2 — SIGNIFICANT CHANGE UP
EOSINOPHIL # BLD AUTO: 0.08 K/UL — SIGNIFICANT CHANGE UP (ref 0–0.5)
EOSINOPHIL NFR BLD AUTO: 0.9 % — SIGNIFICANT CHANGE UP (ref 0–6)
GLUCOSE SERPL-MCNC: 131 MG/DL — HIGH (ref 70–99)
HCT VFR BLD CALC: 40.2 % — SIGNIFICANT CHANGE UP (ref 39–50)
HGB BLD-MCNC: 14.6 G/DL — SIGNIFICANT CHANGE UP (ref 13–17)
IMM GRANULOCYTES NFR BLD AUTO: 0.7 % — SIGNIFICANT CHANGE UP (ref 0–1.5)
LYMPHOCYTES # BLD AUTO: 0.99 K/UL — LOW (ref 1–3.3)
LYMPHOCYTES # BLD AUTO: 11 % — LOW (ref 13–44)
MCHC RBC-ENTMCNC: 31.4 PG — SIGNIFICANT CHANGE UP (ref 27–34)
MCHC RBC-ENTMCNC: 36.3 GM/DL — HIGH (ref 32–36)
MCV RBC AUTO: 86.5 FL — SIGNIFICANT CHANGE UP (ref 80–100)
MONOCYTES # BLD AUTO: 0.53 K/UL — SIGNIFICANT CHANGE UP (ref 0–0.9)
MONOCYTES NFR BLD AUTO: 5.9 % — SIGNIFICANT CHANGE UP (ref 2–14)
NEUTROPHILS # BLD AUTO: 7.32 K/UL — SIGNIFICANT CHANGE UP (ref 1.8–7.4)
NEUTROPHILS NFR BLD AUTO: 80.9 % — HIGH (ref 43–77)
NRBC # BLD: 0 /100 WBCS — SIGNIFICANT CHANGE UP (ref 0–0)
PLATELET # BLD AUTO: 207 K/UL — SIGNIFICANT CHANGE UP (ref 150–400)
POTASSIUM SERPL-MCNC: 3.9 MMOL/L — SIGNIFICANT CHANGE UP (ref 3.5–5.3)
POTASSIUM SERPL-SCNC: 3.9 MMOL/L — SIGNIFICANT CHANGE UP (ref 3.5–5.3)
PROT SERPL-MCNC: 7.2 G/DL — SIGNIFICANT CHANGE UP (ref 6–8.3)
RBC # BLD: 4.65 M/UL — SIGNIFICANT CHANGE UP (ref 4.2–5.8)
RBC # FLD: 12.8 % — SIGNIFICANT CHANGE UP (ref 10.3–14.5)
SODIUM SERPL-SCNC: 143 MMOL/L — SIGNIFICANT CHANGE UP (ref 135–145)
TROPONIN I, HIGH SENSITIVITY RESULT: 6.7 NG/L — SIGNIFICANT CHANGE UP
TROPONIN I, HIGH SENSITIVITY RESULT: 7.3 NG/L — SIGNIFICANT CHANGE UP
WBC # BLD: 9.03 K/UL — SIGNIFICANT CHANGE UP (ref 3.8–10.5)
WBC # FLD AUTO: 9.03 K/UL — SIGNIFICANT CHANGE UP (ref 3.8–10.5)

## 2022-05-03 PROCEDURE — 93010 ELECTROCARDIOGRAM REPORT: CPT

## 2022-05-03 PROCEDURE — 85025 COMPLETE CBC W/AUTO DIFF WBC: CPT

## 2022-05-03 PROCEDURE — 80053 COMPREHEN METABOLIC PANEL: CPT

## 2022-05-03 PROCEDURE — 99285 EMERGENCY DEPT VISIT HI MDM: CPT

## 2022-05-03 PROCEDURE — 99284 EMERGENCY DEPT VISIT MOD MDM: CPT

## 2022-05-03 PROCEDURE — 93005 ELECTROCARDIOGRAM TRACING: CPT

## 2022-05-03 PROCEDURE — 99221 1ST HOSP IP/OBS SF/LOW 40: CPT

## 2022-05-03 PROCEDURE — 84484 ASSAY OF TROPONIN QUANT: CPT

## 2022-05-03 PROCEDURE — 36415 COLL VENOUS BLD VENIPUNCTURE: CPT

## 2022-05-03 RX ORDER — AMLODIPINE BESYLATE 2.5 MG/1
1 TABLET ORAL
Qty: 0 | Refills: 0 | DISCHARGE

## 2022-05-03 RX ORDER — LISINOPRIL 2.5 MG/1
1 TABLET ORAL
Qty: 0 | Refills: 0 | DISCHARGE

## 2022-05-03 NOTE — ED PROVIDER NOTE - NSFOLLOWUPINSTRUCTIONS_ED_ALL_ED_FT
Please follow up with your cardiologist as soon as possible.   Drink plenty of fluids and avoid the cough/cold medicine you were taking.

## 2022-05-03 NOTE — ED PROVIDER NOTE - OBJECTIVE STATEMENT
66M pmhx of HTN (on amlodipine, recently stopped lisinopril) presents to the ED with an episode of nausea, lightheadedness and dizziness this morning, lasting a few seconds. Took BP and noted it was 150/90 which is elevated from baseline. He had been feeling under the weather and was taking cough medicine (dextromethorphan) yesterday, last dose 11pm. Denies fevers today, chest pain, SOB, vomiting, diarrhea. Patient currently asymptomatic. Recent cardiac workup normal.   Being worked up for possible ?[place] [facility] [place]. 66M pmhx of HTN (on amlodipine, recently stopped lisinopril) presents to the ED with an episode of nausea, lightheadedness and dizziness this morning, lasting a few seconds. Took BP and noted it was 150/90 which is elevated from baseline. He had been feeling under the weather and was taking cough medicine (dextromethorphan) yesterday, last dose 11pm. Denies fevers today, chest pain, SOB, vomiting, diarrhea. Patient currently asymptomatic. Recent cardiac workup normal last october.  Being worked up for possible [place] [facility] [place]

## 2022-05-03 NOTE — ED PROVIDER NOTE - NSTIMEPROVIDERCAREINITIATE_GEN_ER
Problem: Breastfeeding (Infant)  Goal: Identify Related Risk Factors and Signs and Symptoms  Related risk factors and signs and symptoms are identified upon initiation of Human Response Clinical Practice Guideline (CPG)   Outcome: Ongoing (interventions implemented as appropriate)  Mother/Baby being followed by lactation       03-May-2022 07:38

## 2022-05-03 NOTE — ED PROVIDER NOTE - PROGRESS NOTE DETAILS
patient doing well, still asymptomatic. Trop 6.7 -> 7.3 at 2 hours. EKG NSR no YAYO/D. Called Dr. Nino and she will come down to see patient. No need for admission at this time. Likely near-syncope in setting of viral illness and cough meds.   Will DC home with close cardiology and PMD follow up.

## 2022-05-03 NOTE — ED PROVIDER NOTE - PHYSICAL EXAMINATION
Kallie Suarez, .:   GENERAL: Patient awake alert NAD.  HEENT: NC/AT, Moist mucous membranes, PERRL, EOMI.  LUNGS: CTAB, no wheezes or crackles.   CARDIAC: RRR, no m/r/g.    ABDOMEN: Soft, NT, ND, No rebound, guarding. No CVA tenderness.   EXT: No edema. No calf tenderness.  MSK: No pain with movement, no deformities.  NEURO: A&Ox3. Moving all extremities.  SKIN: Warm and dry.  PSYCH: Normal affect.

## 2022-05-03 NOTE — ED ADULT NURSE NOTE - NSIMPLEMENTINTERV_GEN_ALL_ED
Implemented All Fall with Harm Risk Interventions:  Jermyn to call system. Call bell, personal items and telephone within reach. Instruct patient to call for assistance. Room bathroom lighting operational. Non-slip footwear when patient is off stretcher. Physically safe environment: no spills, clutter or unnecessary equipment. Stretcher in lowest position, wheels locked, appropriate side rails in place. Provide visual cue, wrist band, yellow gown, etc. Monitor gait and stability. Monitor for mental status changes and reorient to person, place, and time. Review medications for side effects contributing to fall risk. Reinforce activity limits and safety measures with patient and family. Provide visual clues: red socks.

## 2022-05-03 NOTE — ED PROVIDER NOTE - CARE PROVIDER_API CALL
Bossman Rios  CARDIOLOGY  [address], [address]  [place] [id number]  Phone: [phone]  Fax: [phone]  Follow Up Time:

## 2022-05-03 NOTE — ED PROVIDER NOTE - PATIENT PORTAL LINK FT
No You can access the FollowMyHealth Patient Portal offered by [facility] by registering at the following website: [email or web address]. By joining [facility]’s FollowMyHealth portal, you will also be able to view your health information using other applications (apps) compatible with our system.

## 2022-05-03 NOTE — CONSULT NOTE ADULT - SUBJECTIVE AND OBJECTIVE BOX
MIGDALIA HOOKS  [id number]      HPI:    Migdalia Hooks is a 66 year old man with past medical history of Hypertension, Hyperlipidemia and prior COVID-19 infection in 2022,         Of note, he was recently evaluated by pulmonologist, Dr. Jono Zambrano for abnormal CT chest findings.     ALLERGIES:  amoxicillin (Unknown)      PAST MEDICAL & SURGICAL HISTORY:  HTN (hypertension)          CURRENT MEDICATIONS:      SOCIAL HISTORY:      FAMILY HISTORY:      ROS:  All 10 systems reviewed and positives noted in HPI    OBJECTIVE:    VITAL SIGNS:  Vital Signs Last 24 Hrs  T(C): 36.3 (03 May 2022 09:45), Max: 36.4 (03 May 2022 07:32)  T(F): 97.3 (03 May 2022 09:45), Max: 97.5 (03 May 2022 07:32)  HR: 75 (03 May 2022 10:33) (67 - 76)  BP: 141/73 (03 May 2022 10:33) (141/73 - 152/79)  BP(mean): 89 (03 May 2022 10:33) (89 - 90)  RR: 18 (03 May 2022 10:33) (18 - 18)  SpO2: 97% (03 May 2022 10:33) (97% - 98%)    PHYSICAL EXAM:  General: well appearing, no distress  HEENT: sclera anicteric  Neck: supple, no carotid bruits b/l  CVS: JVP ~ 7 cm H20, RRR, s1, s2, no murmurs/rubs/gallops  Chest: unlabored respirations, clear to auscultation b/l  Abdomen: non-distended  Extremities: no lower extremity edema b/l  Neuro: awake, alert & oriented x 3  Psych: normal affect      LABS:                        14.6   9.03  )-----------( 207      ( 03 May 2022 08:00 )             40.2     05-    143  |  106  |  18  ----------------------------<  131<H>  3.9   |  27  |  1.15    Ca    8.7      03 May 2022 08:00    TPro  7.2  /  Alb  3.7  /  TBili  0.9  /  DBili  x   /  AST  20  /  ALT  26  /  AlkPhos  90  05-03      Outpatient TTE (10/2021):  Normal LV systolic function, LVEF 54%  Normal RV size and function  Normal biatrial sizes  Mild AR  No pericardial effusion       Outpatient Exercise Treadmill Stress Test (2021):  Normal (94% MPHR)    CT Heart (2021):  CALCIUM SCORE: The calculated Agatston score is 0.  Agatston Score:  Left main: 0  Left anterior descendin  Left circumflex: 0  Right coronary: 0      HEART/PERICARDIUM: Heart size is within normal limits, qualitatively. Trace physiologic pericardial fluid.    VISUALIZED THORACIC AORTA: Normal caliber. Distal ascending segment, arch, and proximal descending segment are not imaged.    VISUALIZED MAIN PULMONARY ARTERY: Normal caliber. Pulmonary arterial tree is not adequately opacified to assess for patency.      IMPRESSION:  1. The calculated Agatston score is 0. MOBLEY percentile: N/A  2. Distal airways impaction, predominantly of the right middle lobe. Associated clustered nodularity and groundglass which may reflect distal airways mucus impaction/small airways infection/inflammation. Additional subcentimeter pulmonary nodules measuring up to 0.4 cm. Follow-up chest CT is recommended in 6 weeks to evaluate for stability versus resolution of these findings.           MIGDALIA HOOKS  [id number]      HPI:    Migdalia Hooks is a 66 year old man with past medical history of Hypertension, Hyperlipidemia and prior COVID-19 infection in 2022 presents with sore throat, fatigue, nausea and diaphoresis.    Of note, he was recently evaluated by pulmonologist, Dr. Jono Zambrano for abnormal CT chest findings. The patient is present with his wife. He reports having symptoms of fatigue and sore throat this past weekend. Reports having episode of nausea and sweating which prompted his presentation to the hospital. Appears to have had negative COVID test at home. Denies chest pain or shortness of breath. Denies palpitations, presyncope or syncope.       ALLERGIES:  amoxicillin (Unknown)      PAST MEDICAL HISTORY:  HTN (hypertension)  Hyperlipidemia         ROS:  All 10 systems reviewed and positives noted in HPI    OBJECTIVE:    VITAL SIGNS:  Vital Signs Last 24 Hrs  T(C): 36.3 (03 May 2022 09:45), Max: 36.4 (03 May 2022 07:32)  T(F): 97.3 (03 May 2022 09:45), Max: 97.5 (03 May 2022 07:32)  HR: 75 (03 May 2022 10:33) (67 - 76)  BP: 141/73 (03 May 2022 10:33) (141/73 - 152/79)  BP(mean): 89 (03 May 2022 10:33) (89 - 90)  RR: 18 (03 May 2022 10:33) (18 - 18)  SpO2: 97% (03 May 2022 10:33) (97% - 98%)    PHYSICAL EXAM:  General: middle aged man, no acute distress  HEENT: sclera anicteric  Neck: supple, no carotid bruits b/l  CVS: JVP ~ 7 cm H20, RRR, s1, s2, no murmurs/rubs/gallops  Chest: unlabored respirations, clear to auscultation b/l  Extremities: no lower extremity edema b/l  Neuro: awake, alert & oriented x 3  Psych: normal affect      LABS:                        14.6   9.03  )-----------( 207      ( 03 May 2022 08:00 )             40.2     05-03    143  |  106  |  18  ----------------------------<  131<H>  3.9   |  27  |  1.15    Ca    8.7      03 May 2022 08:00    TPro  7.2  /  Alb  3.7  /  TBili  0.9  /  DBili  x   /  AST  20  /  ALT  26  /  AlkPhos  90  05-03      Outpatient TTE (10/2021):  Normal LV systolic function, LVEF 54%  Normal RV size and function  Normal biatrial sizes  Mild AR  No pericardial effusion       Outpatient Exercise Treadmill Stress Test (2021):  Normal (94% MPHR)    CT Heart (2021):  CALCIUM SCORE: The calculated Agatston score is 0.  Agatston Score:  Left main: 0  Left anterior descendin  Left circumflex: 0  Right coronary: 0      HEART/PERICARDIUM: Heart size is within normal limits, qualitatively. Trace physiologic pericardial fluid.    VISUALIZED THORACIC AORTA: Normal caliber. Distal ascending segment, arch, and proximal descending segment are not imaged.    VISUALIZED MAIN PULMONARY ARTERY: Normal caliber. Pulmonary arterial tree is not adequately opacified to assess for patency.      IMPRESSION:  1. The calculated Agatston score is 0. MOBLEY percentile: N/A  2. Distal airways impaction, predominantly of the right middle lobe. Associated clustered nodularity and groundglass which may reflect distal airways mucus impaction/small airways infection/inflammation. Additional subcentimeter pulmonary nodules measuring up to 0.4 cm. Follow-up chest CT is recommended in 6 weeks to evaluate for stability versus resolution of these findings.

## 2022-05-03 NOTE — ED ADULT NURSE NOTE - OBJECTIVE STATEMENT
66 yr old male to ED for evaluation of cold sweats. Pt states "I think I am having heart attack symptoms."  Pt states he broke out into a cold sweat and nausea and "couldn't sleep last night". Pt denies chest pain, shortness of breath, dizziness, vomiting, fevers, chills. PMHX: HTN. 66 yr old male to ED for evaluation of cold sweats. Pt states "I think I am having heart attack symptoms."  Pt states he broke out into a cold sweat and nausea and "couldn't sleep last night, I've been taking cough medicine". Pt denies chest pain, shortness of breath, dizziness, vomiting, fevers, chills. PMHX: HTN.

## 2022-05-03 NOTE — ED ADULT TRIAGE NOTE - CHIEF COMPLAINT QUOTE
"I think I am having heart attack symptoms."  Pt states he broke out into a cold sweat and nausea 15 min PTA. Pt denies CP, dizziness, SOB or any other symptoms at this time.

## 2022-05-03 NOTE — ED PROVIDER NOTE - CLINICAL SUMMARY MEDICAL DECISION MAKING FREE TEXT BOX
66M p/w short episode of diaphoresis and nausea.   R/O ACS: EKG, trop x2  More likely cough medicine reaction vs. near syncopal episode.

## 2022-05-04 ENCOUNTER — NON-APPOINTMENT (OUTPATIENT)
Age: 67
End: 2022-05-04

## 2022-05-04 ENCOUNTER — TRANSCRIPTION ENCOUNTER (OUTPATIENT)
Age: 67
End: 2022-05-04

## 2022-05-04 RX ORDER — LISINOPRIL 5 MG/1
5 TABLET ORAL
Qty: 30 | Refills: 0 | Status: DISCONTINUED | COMMUNITY
Start: 2022-02-08 | End: 2022-05-04

## 2022-05-10 NOTE — CHART NOTE - NSCHARTNOTEFT_GEN_A_CORE
SW placed call to patient to discuss and assist with follow up care.  Patient presented to ED on 5/3/22 for light headedness and dizziness.  Patient did not answer.  As per HIE, patient has scheduled appt with pulmonologist on 5/24/22.
SW met with patient at bedside to assess for social work needs and to complete home assessment of safety.  Patient is a 66 year old male presenting to ED with c/o nausea, lightheadedness and dizziness.  Patients wife also at bedside.  SW introduced self and role of SW. Patient reports he is independent, denies any safety concerns, and denies the need for SW assistance or resources.  Patient reports he will follow up with cardiologist, Dr Rios and schedule appt.  Patient denied needing SW assistance at this time.  Patient made aware SW will call to follow up and assist if needed at that time.

## 2022-05-20 PROBLEM — I10 ESSENTIAL (PRIMARY) HYPERTENSION: Chronic | Status: ACTIVE | Noted: 2022-05-03

## 2022-05-24 ENCOUNTER — NON-APPOINTMENT (OUTPATIENT)
Age: 67
End: 2022-05-24

## 2022-05-24 ENCOUNTER — APPOINTMENT (OUTPATIENT)
Dept: PULMONOLOGY | Facility: CLINIC | Age: 67
End: 2022-05-24
Payer: MEDICARE

## 2022-05-24 VITALS
OXYGEN SATURATION: 98 % | DIASTOLIC BLOOD PRESSURE: 70 MMHG | BODY MASS INDEX: 28.07 KG/M2 | WEIGHT: 205 LBS | RESPIRATION RATE: 16 BRPM | HEIGHT: 71.75 IN | TEMPERATURE: 96.2 F | SYSTOLIC BLOOD PRESSURE: 124 MMHG | HEART RATE: 73 BPM

## 2022-05-24 PROCEDURE — 99214 OFFICE O/P EST MOD 30 MIN: CPT | Mod: 25

## 2022-05-24 PROCEDURE — 95012 NITRIC OXIDE EXP GAS DETER: CPT

## 2022-05-24 PROCEDURE — 94010 BREATHING CAPACITY TEST: CPT

## 2022-05-24 RX ORDER — COVID-19 MOLECULAR TEST ASSAY
KIT MISCELLANEOUS
Qty: 8 | Refills: 0 | Status: ACTIVE | COMMUNITY
Start: 2022-05-12

## 2022-05-24 RX ORDER — FAMOTIDINE 40 MG/1
40 TABLET, FILM COATED ORAL
Qty: 90 | Refills: 1 | Status: ACTIVE | COMMUNITY
Start: 2022-05-24 | End: 1900-01-01

## 2022-05-24 NOTE — ASSESSMENT
[FreeTextEntry1] : Mr. HOOKS is a 66 year old male with a history of never smoking HLD, HTN, cervical adenopathy, nonrheumatic mitral regurgitation, abnormal CT of chest, Covid 19 1/2022, anxiety, low vitamin D, chicken pox, measles, presenting to the office today for follow up pulmonary evaluation for chronic cough, abnormal CT of the chest, likely RADs proceeding covid 19 , (+)ASAD (mild)\par \par \par His shortness of breath is multifactorial due to:\par -poor mechanics of breathing \par -out of shape / overweight\par -pulmonary disease\par    -mild asthma \par -?cardiac disease \par \par His chronic cough is felt to be multifactorial due to:\par -Asthma / COPD\par -Post nasal drip syndrome\par -GERD ?\par -?medication (doubting lisinopril) \par \par problem 1: mild asthma \par -add Breo Ellipta 200 at 1 inhalation QD \par -Asthma is  believed to be caused by inherited (genetic) and environmental factor, but its exact cause is unknown. Asthma may be triggered by allergens, lung infections, or irritants in the air. Asthma triggers are different for each person \par -Inhaler technique reviewed as well as oral hygiene techniques reviewed with patient. Avoidance of cold air, extremes of temperature, rescue inhaler should be used before exercise. Order of medication reviewed with patient. Recommended use of a cool mist humidifier in the bedroom. \par \par probem 1A: Elevated TgE\par -Xolair if needed\par \par problem 2:allergies/ PND\par -add olopatadine 0.6% 1 sniff BID/ saline\par -s/p Blood work to include:- asthma panel,- food IgE panel, IgE level (333), - eosinophil level, - vitamin D level \par -Environmental measures for allergies were encouraged including mattress and pillow covers, air purifier, and environmental controls. \par \par problem 3: Abnormal PFTs (elevated FENO 33 c/w inflammation) \par \par problem 3A: ?LPR/GERD/Globus \par -Add Pepcid 40 mg QHS \par - Things to avoid including overeating, spicy foods, tight clothing, eating within three hours of bed, this list is not all inclusive.\par - For treatment of reflux, possible options discussed including diet control, H2 blockers, PPIs, as well as coating motility agents discussed as treatment options. Timing of meals and proximity of last meal to sleep were discussed. If symptoms persist, a formal gastrointestinal evaluation is needed. \par \par Problem 4: Abnormal CT of chest- c/w post inflammatory or infectious rather than malignant.-small nodules \par -?hypersensitivity pneumonitis\par -?TB\par -?sarcoidosis, \par -s/p f/u CT 6/2022\par -s/p HP panel, quantiferon gold, ACE level -\par CAT scans are the only radiological modality to identify abnormalities w/in the lungs with regards to nodules/masses/lymph nodes. Risks, benefits were reviewed in detail. The guidelines for abnormalities include follow up CT scans at various intervals which could range from 6 weeks to 1 year intervals. If there is a change for the worse then consideration for a biopsy will be considered if you are a candidate. Second opinion evaluation with a thoracic surgeon or an interventional radiologist could be offered. \par \par Problem 5: + ASAD (snoring, EDS, Elevated mallampati class, neck size) \par -s/p home sleep study (mild); DD vs Excite \par Sleep apnea is associated with adverse clinical consequences which can affect most organ systems.  Cardiovascular disease risk includes arrhythmias, atrial fibrillation, hypertension, coronary artery disease, and stroke. Metabolic disorders include diabetes type 2, non-alcoholic fatty liver disease. Mood disorder especially depression; and cognitive decline especially in the elderly. Associations with  chronic reflux/Kurtz’s esophagus some but not all inclusive. \par -Reasons  include arousal consistent with hypopnea; respiratory events most prominent in REM sleep or supine \par position; therefore sleep staging and body position are important for accurate diagnosis and estimation of AHI. \par \par Problem 6 s/p Covid 19 1/2022\par -s/p Covid 19 vaccine x3 \par \par problem 7: cardiac disease\par -recommended to continue to follow up with Cardiologist \par \par problem 8: poor breathing mechanics\par - Recommended Wim Hof and Buteyko techniques \par -Proper breathing techniques were reviewed with an emphasis of exhalation. Patient instructed to breath in for 1 second and out for four seconds. Patient was encouraged to not talk while walking. \par \par problem 9: out of shape / overweight\par -Weight loss, exercise, and diet control were discussed and are highly encouraged. Treatment options were given such as, aqua therapy, and contacting a nutritionist. Recommended to use the elliptical, stationary bike, less use of treadmill. Mindful eating was explained to the patient Obesity is associated with worsening asthma, shortness of breath, and potential for cardiac disease, diabetes, and other underlying medical conditions\par \par problem 10: health maintenance \par -recommended yearly flu shot after October 15\par -recommended strep pneumonia vaccines: Prevnar-13 vaccine, followed by Pneumo vaccine 23 one year following after 65\par -recommended early intervention for Upper Respiratory Infections (URIs)\par -recommended regular osteoporosis evaluations\par -recommended early dermatological evaluations\par -recommended after the age of 50 to the age of 70, colonoscopy every 5 years\par \par F/U in 6-8 weeks.\par He is encouraged to call with any changes, concerns, or questions\par

## 2022-05-24 NOTE — ADDENDUM
[FreeTextEntry1] : Documented by José Engel acting as a scribe for Dr. Jono Zambrano on (05/24/2022).\par \par All medical record record entries made by the Scribe were at my, Dr. Jono Zambrano's, direction and personally dictated by me on (05/24/2022). I have reviewed the chart and agree that the record accurately reflects my personal performance of the history, physical exam, assessment and plan. I have personally directed, reviewed, and agree with the discharge instructions.

## 2022-05-24 NOTE — REASON FOR VISIT
[Follow-Up] : a follow-up visit [TextBox_44] : chronic cough, abnormal CT of the chest, likely RADs proceeding covid 19 , likely ASAD \par

## 2022-05-24 NOTE — PROCEDURE
[FreeTextEntry1] : PFT reveals normal flows, with an FEV1 of  3.56L, which is 100% of predicted, with a normal flow volume loop \par \par FENO was 13; a normal value being less than 25\par Fractional exhaled nitric oxide (FENO) is regarded as a simple, noninvasive method for assessing eosinophilic airway inflammation. Produced by a variety of cells within the lung, nitric oxide (NO) concentrations are generally low in healthy individuals. However, high concentrations of NO appear to be involved in nonspecific host defense mechanisms and chronic inflammatory diseases such as asthma. The American Thoracic Society (ATS) therefore has recommended using FENO to aid in the diagnosis and monitoring of eosinophilic airway inflammation and asthma, and for identifying steroid responsive individuals whose chronic respiratory symptoms may be caused by airway inflammation.

## 2022-05-24 NOTE — HISTORY OF PRESENT ILLNESS
[TextBox_4] : Mr. HOOKS is a 66 year old male with a history of never smoking, HLD, HTN, cervical adenopathy, nonrheumatic mitral regurgitation, abnormal CT of chest, Covid 19 1/2022, anxiety, low vitamin D, chicken pox, measles, presenting to the office today for follow up pulmonary evaluation. His chief complaint is\par - he notes feeling well in general \par - he notes bowels are regular \par - he denies any difficulty swallowing\par - he notes still coughing little \par - he notes getting another virus since after his last visit \par - he notes it bought up another cough \par - he notes during it he had an episode where he was sweating badly and sx were of heart attack\par - he notes he took cardiac enzyme, and was normal \par - he notes getting arm biopsy and was negative \par - he notes going to ENT and everything was clear \par - s/p COVID-19 vax x3 \par \par - He denies any headaches, nausea, vomiting, fever, chills, sweats, chest pain, chest pressure, palpitations, SOB, wheezing, fatigue, diarrhea, constipation, dysphagia, myalgias, dizziness, leg swelling, leg pain, itchy eyes, itchy ears, heartburn, reflux, or sour taste in the mouth

## 2022-06-06 ENCOUNTER — NON-APPOINTMENT (OUTPATIENT)
Age: 67
End: 2022-06-06

## 2022-06-06 ENCOUNTER — APPOINTMENT (OUTPATIENT)
Dept: ALLERGY | Facility: CLINIC | Age: 67
End: 2022-06-06
Payer: MEDICARE

## 2022-06-06 DIAGNOSIS — Z77.22 CONTACT WITH AND (SUSPECTED) EXPOSURE TO ENVIRONMENTAL TOBACCO SMOKE (ACUTE) (CHRONIC): ICD-10-CM

## 2022-06-06 PROCEDURE — 99204 OFFICE O/P NEW MOD 45 MIN: CPT | Mod: 25

## 2022-06-06 PROCEDURE — 95018 ALL TSTG PERQ&IQ DRUGS/BIOL: CPT

## 2022-06-06 PROCEDURE — 95004 PERQ TESTS W/ALRGNC XTRCS: CPT

## 2022-06-07 ENCOUNTER — TRANSCRIPTION ENCOUNTER (OUTPATIENT)
Age: 67
End: 2022-06-07

## 2022-06-28 NOTE — CONSULT LETTER
[Dear  ___] : Dear  [unfilled], [Thank you for referring [unfilled] for consultation for _____] : Thank you for referring [unfilled] for consultation for [unfilled] [Please see my note below.] : Please see my note below. [Sincerely,] : Sincerely, [FreeTextEntry3] : Mitchell B. Boxer, M.D., FAAAAI\par [facility]\par \par Department of Allergy-Immunology\par [facility] \par [address] [place] [address]\par [place] [address]\par Tel:   [phone]\par Fax:  [phone]\par Email: [email or web address]

## 2022-06-28 NOTE — SOCIAL HISTORY
[Spouse/Partner] : spouse/partner [House] : [unfilled] lives in a house  [Radiator/Baseboard] : heating provided by radiator(s)/baseboard(s) [Central] : air conditioning provided by central unit [Dog] : dog [] :  [de-identified] : mother in law 2 sons  [FreeTextEntry2] : [facility] [place]  [Bedroom] : not in the bedroom [Basement] : not in the basement [Living Area] : not in the living area [Smokers in Household] : there are no smokers in the home [de-identified] : non shedding

## 2022-06-28 NOTE — ASSESSMENT
[FreeTextEntry1] : Moderate persistent asthma well controlled with Breo:\par \par Continue present medical therapy and follow up with Dr. Zambrano\par Elevated IgE level secondary to mild environmental allergies - no treatment indicated at this time\par \par Constant throat clearing resolved with famotidine:\par \par Continue present famotidine and follow up with Dr. Zambrano

## 2022-06-28 NOTE — HISTORY OF PRESENT ILLNESS
[Asthma] : asthma [Allergic Rhinitis] : allergic rhinitis [Eczematous rashes] : eczematous rashes [Venom Reactions] : venom reactions [Food Allergies] : food allergies [de-identified] : Patient saw Dr. Zambrano - shadow noted on lung - CT chest - being followed up in a few months.   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    Patient prescribed Breo for breathing problems and coughing - symptoms worse at night time while watching TV.   Small throat clearing sounds in chest with sporadic cough.   He started famotidine for GERD.    Symptoms lessened with both Breo and famotidine.    \par \par Blood work up revealed elevated IgE with negative ImmunoCAP.

## 2022-07-08 ENCOUNTER — NON-APPOINTMENT (OUTPATIENT)
Age: 67
End: 2022-07-08

## 2022-07-08 ENCOUNTER — APPOINTMENT (OUTPATIENT)
Dept: FAMILY MEDICINE | Facility: CLINIC | Age: 67
End: 2022-07-08

## 2022-07-08 VITALS — SYSTOLIC BLOOD PRESSURE: 130 MMHG | DIASTOLIC BLOOD PRESSURE: 72 MMHG

## 2022-07-08 VITALS
SYSTOLIC BLOOD PRESSURE: 146 MMHG | RESPIRATION RATE: 14 BRPM | DIASTOLIC BLOOD PRESSURE: 81 MMHG | BODY MASS INDEX: 28.56 KG/M2 | OXYGEN SATURATION: 98 % | HEART RATE: 73 BPM | TEMPERATURE: 97.9 F | HEIGHT: 71 IN | WEIGHT: 204 LBS

## 2022-07-08 DIAGNOSIS — R91.1 SOLITARY PULMONARY NODULE: ICD-10-CM

## 2022-07-08 DIAGNOSIS — L30.9 DERMATITIS, UNSPECIFIED: ICD-10-CM

## 2022-07-08 DIAGNOSIS — R35.0 FREQUENCY OF MICTURITION: ICD-10-CM

## 2022-07-08 PROCEDURE — G0402 INITIAL PREVENTIVE EXAM: CPT

## 2022-07-08 PROCEDURE — 93000 ELECTROCARDIOGRAM COMPLETE: CPT | Mod: 59

## 2022-07-08 PROCEDURE — 36415 COLL VENOUS BLD VENIPUNCTURE: CPT

## 2022-07-08 PROCEDURE — G0439: CPT

## 2022-07-08 RX ORDER — VALACYCLOVIR 1 G/1
1 TABLET, FILM COATED ORAL TWICE DAILY
Qty: 4 | Refills: 4 | Status: COMPLETED | COMMUNITY
Start: 2021-12-22 | End: 2022-07-08

## 2022-07-08 RX ORDER — OLOPATADINE HYDROCHLORIDE 665 UG/1
0.6 SPRAY, METERED NASAL
Qty: 3 | Refills: 1 | Status: COMPLETED | COMMUNITY
Start: 2022-03-21 | End: 2022-07-08

## 2022-07-08 RX ORDER — FLUTICASONE PROPIONATE 50 UG/1
50 SPRAY, METERED NASAL
Qty: 16 | Refills: 0 | Status: COMPLETED | COMMUNITY
Start: 2022-03-07 | End: 2022-07-08

## 2022-07-08 RX ORDER — BENZONATATE 200 MG/1
200 CAPSULE ORAL
Qty: 21 | Refills: 0 | Status: COMPLETED | COMMUNITY
Start: 2022-01-30 | End: 2022-07-08

## 2022-07-08 RX ORDER — MOMETASONE FUROATE 1 MG/G
0.1 CREAM TOPICAL
Qty: 45 | Refills: 0 | Status: COMPLETED | COMMUNITY
Start: 2022-01-14 | End: 2022-07-08

## 2022-07-08 RX ORDER — FLUTICASONE PROPIONATE 50 UG/1
50 SPRAY, METERED NASAL
Qty: 1 | Refills: 0 | Status: COMPLETED | COMMUNITY
Start: 2022-02-08 | End: 2022-07-08

## 2022-07-08 NOTE — HEALTH RISK ASSESSMENT
[Very Good] : ~his/her~  mood as very good [Never] : Never [Yes] : Yes [2 - 4 times a month (2 pts)] : 2-4 times a month (2 points) [1 or 2 (0 pts)] : 1 or 2 (0 points) [Never (0 pts)] : Never (0 points) [No] : In the past 12 months have you used drugs other than those required for medical reasons? No [No falls in past year] : Patient reported no falls in the past year [0] : 2) Feeling down, depressed, or hopeless: Not at all (0) [PHQ-2 Negative - No further assessment needed] : PHQ-2 Negative - No further assessment needed [None] : None [With Family] : lives with family [# of Members in Household ___] :  household currently consist of [unfilled] member(s) [Employed] : employed [] :  [# Of Children ___] : has [unfilled] children [Sexually Active] : sexually active [Feels Safe at Home] : Feels safe at home [Fully functional (using the telephone, shopping, preparing meals, housekeeping, doing laundry, using] : Fully functional and needs no help or supervision to perform IADLs (using the telephone, shopping, preparing meals, housekeeping, doing laundry, using transportation, managing medications and managing finances) [Fully functional (bathing, dressing, toileting, transferring, walking, feeding)] : Fully functional (bathing, dressing, toileting, transferring, walking, feeding) [Reports normal functional visual acuity (ie: able to read med bottle)] : Reports normal functional visual acuity [Smoke Detector] : smoke detector [Carbon Monoxide Detector] : carbon monoxide detector [Safety elements used in home] : safety elements used in home [Seat Belt] :  uses seat belt [Sunscreen] : uses sunscreen [Reviewed no changes] : Reviewed, no changes [Designated Healthcare Proxy] : Designated healthcare proxy [Relationship: ___] : Relationship: [unfilled] [Aggressive treatment] : aggressive treatment [I will adhere to the patient's wishes.] : I will adhere to the patient's wishes. [Audit-CScore] : 2 [[id number]] : 0 [Change in mental status noted] : No change in mental status noted [Language] : denies difficulty with language [Behavior] : denies difficulty with behavior [Learning/Retaining New Information] : denies difficulty learning/retaining new information [Handling Complex Tasks] : denies difficulty handling complex tasks [Reasoning] : denies difficulty with reasoning [Spatial Ability and Orientation] : denies difficulty with spatial ability and orientation [High Risk Behavior] : no high risk behavior [Reports changes in hearing] : Reports no changes in hearing [Reports changes in vision] : Reports no changes in vision [Reports changes in dental health] : Reports no changes in dental health [Guns at Home] : no guns at home [Travel to Developing Areas] : does not  travel to developing areas [TB Exposure] : is not being exposed to tuberculosis [Caregiver Concerns] : does not have caregiver concerns [de-identified] : Glasses [AdvancecareDate] : 07/22

## 2022-07-08 NOTE — HISTORY OF PRESENT ILLNESS
[FreeTextEntry1] : See HPI. [de-identified] : Patient is a 66-year-old gentleman who presents today for annual wellness exam.  Patient has been seen by multiple subspecialist which include pulmonology, allergist, dermatologist and hematology oncology at [facility].  Medical history is significant for hypertension, questionable reflux, pulmonary nodules which appear to be inflammatory in nature patient has had CT scan of chest and also has had PET CT which was done at [facility] that report is not available to me, hyperlipidemia.\par \par Presently the patient is awake alert and oriented x3 he presently denies any chest pain, shortness of breath, cough has actually resolved and states that he is in his usual state of health.  Recently had skin lesion biopsied patient has a nonspecific dermatitis and subsequently certain pathologic findings but not enough to make diagnosis were found at [facility] patient does have follow-up and he will forward all findings to my office.

## 2022-07-08 NOTE — ASSESSMENT
[FreeTextEntry1] : Assessment and plan:\par \par 1.  Comprehensive health maintenance physical exam showed no acute findings.\par \par 2.  Electrocardiogram shows no acute ST-T wave changes stable for patient.\par \par 3.  Questionable reflux patient has been on famotidine 40 mg which he takes at bedtime cough has totally resolved.\par \par 4.  Nonspecific dermatitis patient presently being worked up at [facility] most recent biopsy was nondiagnostic patient also had PET/CT which was within normal limits.\par \par 5.  Pulmonary nodules reviewed CT scan most likely inflammatory in nature patient is followed closely by pulmonology most recent consultation was reviewed.\par \par 6.  Hypertension good blood pressure control with amlodipine presently patient is on 10 mg daily and has not experienced any side effects he does check his blood pressure regularly.\par \par 7.  Questionable underlying asthma presently on Breo Ellipta patient doing well.\par \par 8.  Patient will continue present medical management no change comprehensive blood work drawn in office by examiner patient will be following up with [facility].

## 2022-07-08 NOTE — REVIEW OF SYSTEMS
[Negative] : Heme/Lymph [Chest Pain] : no chest pain [Palpitations] : no palpitations [Claudication] : no  leg claudication [Lower Ext Edema] : no lower extremity edema [Orthopena] : no orthopnea [Paroxysmal Nocturnal Dyspnea] : no paroxysmal nocturnal dyspnea [Shortness Of Breath] : no shortness of breath [Wheezing] : no wheezing [Cough] : no cough [Dyspnea on Exertion] : not dyspnea on exertion [FreeTextEntry6] : Cough resolved

## 2022-07-08 NOTE — CURRENT MEDS
[Takes medication as prescribed] : takes [None] : Patient does not have any barriers to medication adherence [Yes] : Reviewed medication list for presence of high-risk medications. [FreeTextEntry1] : No high risk medications identified.

## 2022-07-11 LAB
ALBUMIN SERPL ELPH-MCNC: 5 G/DL
ALP BLD-CCNC: 94 U/L
ALT SERPL-CCNC: 18 U/L
ANION GAP SERPL CALC-SCNC: 14 MMOL/L
APPEARANCE: CLEAR
AST SERPL-CCNC: 21 U/L
BACTERIA: NEGATIVE
BASOPHILS # BLD AUTO: 0.05 K/UL
BASOPHILS NFR BLD AUTO: 0.7 %
BILIRUB SERPL-MCNC: 1.2 MG/DL
BILIRUBIN URINE: NEGATIVE
BLOOD URINE: NEGATIVE
BUN SERPL-MCNC: 21 MG/DL
CALCIUM SERPL-MCNC: 9.3 MG/DL
CHLORIDE SERPL-SCNC: 104 MMOL/L
CHOLEST SERPL-MCNC: 200 MG/DL
CO2 SERPL-SCNC: 22 MMOL/L
COLOR: NORMAL
CREAT SERPL-MCNC: 1.13 MG/DL
CREAT SPEC-SCNC: 53 MG/DL
EGFR: 72 ML/MIN/1.73M2
EOSINOPHIL # BLD AUTO: 0.13 K/UL
EOSINOPHIL NFR BLD AUTO: 1.8 %
ESTIMATED AVERAGE GLUCOSE: 80 MG/DL
GLUCOSE QUALITATIVE U: NEGATIVE
GLUCOSE SERPL-MCNC: 94 MG/DL
HBA1C MFR BLD HPLC: 4.4 %
HCT VFR BLD CALC: 43.4 %
HDLC SERPL-MCNC: 60 MG/DL
HGB BLD-MCNC: 15.3 G/DL
HYALINE CASTS: 0 /LPF
IMM GRANULOCYTES NFR BLD AUTO: 0.4 %
KETONES URINE: NEGATIVE
LDLC SERPL CALC-MCNC: 127 MG/DL
LEUKOCYTE ESTERASE URINE: NEGATIVE
LYMPHOCYTES # BLD AUTO: 1.23 K/UL
LYMPHOCYTES NFR BLD AUTO: 16.9 %
MAN DIFF?: NORMAL
MCHC RBC-ENTMCNC: 31.1 PG
MCHC RBC-ENTMCNC: 35.3 GM/DL
MCV RBC AUTO: 88.2 FL
MICROALBUMIN 24H UR DL<=1MG/L-MCNC: <1.2 MG/DL
MICROALBUMIN/CREAT 24H UR-RTO: NORMAL MG/G
MICROSCOPIC-UA: NORMAL
MONOCYTES # BLD AUTO: 0.46 K/UL
MONOCYTES NFR BLD AUTO: 6.3 %
NEUTROPHILS # BLD AUTO: 5.37 K/UL
NEUTROPHILS NFR BLD AUTO: 73.9 %
NITRITE URINE: NEGATIVE
NONHDLC SERPL-MCNC: 140 MG/DL
PH URINE: 6
PLATELET # BLD AUTO: 231 K/UL
POTASSIUM SERPL-SCNC: 4.1 MMOL/L
PROT SERPL-MCNC: 7.3 G/DL
PROTEIN URINE: NEGATIVE
PSA SERPL-MCNC: 1.14 NG/ML
RBC # BLD: 4.92 M/UL
RBC # FLD: 13.5 %
RED BLOOD CELLS URINE: 3 /HPF
SODIUM SERPL-SCNC: 140 MMOL/L
SPECIFIC GRAVITY URINE: 1.01
SQUAMOUS EPITHELIAL CELLS: 0 /HPF
TRIGL SERPL-MCNC: 67 MG/DL
UROBILINOGEN URINE: NORMAL
WBC # FLD AUTO: 7.27 K/UL
WHITE BLOOD CELLS URINE: 0 /HPF

## 2022-11-08 ENCOUNTER — TRANSCRIPTION ENCOUNTER (OUTPATIENT)
Age: 67
End: 2022-11-08

## 2023-01-05 ENCOUNTER — NON-APPOINTMENT (OUTPATIENT)
Age: 68
End: 2023-01-05

## 2023-01-05 ENCOUNTER — APPOINTMENT (OUTPATIENT)
Dept: PULMONOLOGY | Facility: CLINIC | Age: 68
End: 2023-01-05
Payer: MEDICARE

## 2023-01-05 ENCOUNTER — RX RENEWAL (OUTPATIENT)
Age: 68
End: 2023-01-05

## 2023-01-05 PROCEDURE — 99442: CPT | Mod: 95

## 2023-01-09 ENCOUNTER — APPOINTMENT (OUTPATIENT)
Dept: PULMONOLOGY | Facility: CLINIC | Age: 68
End: 2023-01-09
Payer: MEDICARE

## 2023-01-09 ENCOUNTER — OUTPATIENT (OUTPATIENT)
Dept: OUTPATIENT SERVICES | Facility: HOSPITAL | Age: 68
LOS: 1 days | End: 2023-01-09
Payer: MEDICARE

## 2023-01-09 ENCOUNTER — APPOINTMENT (OUTPATIENT)
Dept: ULTRASOUND IMAGING | Facility: HOSPITAL | Age: 68
End: 2023-01-09
Payer: MEDICARE

## 2023-01-09 ENCOUNTER — NON-APPOINTMENT (OUTPATIENT)
Age: 68
End: 2023-01-09

## 2023-01-09 ENCOUNTER — RESULT REVIEW (OUTPATIENT)
Age: 68
End: 2023-01-09

## 2023-01-09 VITALS
RESPIRATION RATE: 16 BRPM | HEART RATE: 66 BPM | BODY MASS INDEX: 27.72 KG/M2 | HEIGHT: 71 IN | DIASTOLIC BLOOD PRESSURE: 70 MMHG | WEIGHT: 198 LBS | SYSTOLIC BLOOD PRESSURE: 140 MMHG | TEMPERATURE: 97.2 F | OXYGEN SATURATION: 98 %

## 2023-01-09 DIAGNOSIS — R59.9 ENLARGED LYMPH NODES, UNSPECIFIED: ICD-10-CM

## 2023-01-09 PROCEDURE — 99214 OFFICE O/P EST MOD 30 MIN: CPT | Mod: 25

## 2023-01-09 PROCEDURE — 76536 US EXAM OF HEAD AND NECK: CPT | Mod: 26

## 2023-01-09 PROCEDURE — 76536 US EXAM OF HEAD AND NECK: CPT

## 2023-01-09 PROCEDURE — ZZZZZ: CPT

## 2023-01-09 PROCEDURE — 95012 NITRIC OXIDE EXP GAS DETER: CPT

## 2023-01-09 PROCEDURE — 94010 BREATHING CAPACITY TEST: CPT

## 2023-01-09 RX ORDER — PANTOPRAZOLE 40 MG/1
40 TABLET, DELAYED RELEASE ORAL
Qty: 30 | Refills: 0 | Status: ACTIVE | COMMUNITY
Start: 2022-12-08

## 2023-01-09 NOTE — HISTORY OF PRESENT ILLNESS
[TextBox_4] : Mr. HOOKS is a 67 year old male with a history of never smoking, HLD, HTN, cervical adenopathy, nonrheumatic mitral regurgitation, abnormal CT of chest, Covid 19 1/2022, anxiety, low vitamin D, chicken pox, measles, presenting to the office today for follow up pulmonary evaluation. His chief complaint is\par - he notes his lymph node is not painful at all \par - he notes he feels very good \par - he notes he is active during the day \par - he notes he started doing pushups and situps \par - he notes around Christmas ashley he lost his appetite and then the Wednesday before NYE he started developing nasal congestion. He went through a box of tissue every couple of hours. \par - he notes his cough has greatly subsided in the last couple of days \par - he was put on a rescue inhaler which he uses 3x a day \par - he notes his energy level is 7/10\par - he has been sleeping better now that his cough has improved \par - he note he never got the mouth piece for his sleep apnea \par \par -He denies any visual issues, headaches, nausea, vomiting, fever, chills, sweats, chest pains, chest pressure, diarrhea, constipation, dysphagia, myalgia, dizziness, leg swelling, leg pain, itchy eyes, itchy ears, heartburn, reflux, or sour taste in the mouth.\par

## 2023-01-09 NOTE — PROCEDURE
[FreeTextEntry1] : FENO was 24; normal value being less than 25\par Fractional exhaled nitric oxide (FENO) is regarded as a simple, noninvasive method for assessing eosinophilic airway inflammation. Produced by a variety of cells within the lung, nitric oxide (NO) concentrations are generally low in healthy individuals. However, high concentrations of NO appear to be involved in nonspecific host defense mechanisms and chronic inflammatory diseases such as asthma. The American Thoracic Society (ATS) therefore has recommended using FENO to aid in the diagnosis and monitoring of eosinophilic airway inflammation and asthma, and for identifying steroid responsive individuals whose chronic respiratory symptoms may be airway inflammation. \par \par \par PFT reveals normal flows, with an FEV1 of   3.68 L, which is  104% of predicted, with normal flow volume loop \par

## 2023-01-09 NOTE — REASON FOR VISIT
[Follow-Up] : a follow-up visit [TextBox_44] : chronic cough, abnormal CT of the chest, likely RADs proceeding covid 19 , (+) ASAD \par

## 2023-01-09 NOTE — ASSESSMENT
[FreeTextEntry1] : Mr. HOOKS is a 67 year old male with a history of never smoking HLD, HTN, cervical adenopathy, nonrheumatic mitral regurgitation, abnormal CT of chest, Covid 19 1/2022, anxiety, low vitamin D, chicken pox, measles, presenting to the office today for follow up pulmonary evaluation for chronic cough, abnormal CT of the chest, likely RADs proceeding covid 19 , (+)ASAD (mild) ; s/p URI / bronchospasm, new (L) cervical lymph node \par \par \par His shortness of breath is multifactorial due to:\par -poor mechanics of breathing \par -out of shape / overweight\par -pulmonary disease\par    -mild asthma \par -?cardiac disease \par \par His chronic cough is felt to be multifactorial due to:\par -Asthma / COPD\par -Post nasal drip syndrome\par -GERD ?\par -?medication (doubting lisinopril) \par \par problem 1: mild asthma \par -continue Breo Ellipta 200 at 1 inhalation QD \par - Add Singulair 10 mg QHS\par -Asthma is  believed to be caused by inherited (genetic) and environmental factor, but its exact cause is unknown. Asthma may be triggered by allergens, lung infections, or irritants in the air. Asthma triggers are different for each person \par -Inhaler technique reviewed as well as oral hygiene techniques reviewed with patient. Avoidance of cold air, extremes of temperature, rescue inhaler should be used before exercise. Order of medication reviewed with patient. Recommended use of a cool mist humidifier in the bedroom. \par \par probem 1A: Elevated TgE\par -Xolair if needed\par \par problem 2:allergies/ PND\par -add olopatadine 0.6% 1 sniff BID/ saline\par -s/p Blood work to include:- asthma panel,- food IgE panel, IgE level (333), - eosinophil level, - vitamin D level \par -Environmental measures for allergies were encouraged including mattress and pillow covers, air purifier, and environmental controls. \par \par problem 3: Abnormal PFTs (elevated FENO 33 c/w inflammation) \par \par problem 3A: ?LPR/GERD/Globus \par -Add Pepcid 40 mg QHS \par - Things to avoid including overeating, spicy foods, tight clothing, eating within three hours of bed, this list is not all inclusive.\par - For treatment of reflux, possible options discussed including diet control, H2 blockers, PPIs, as well as coating motility agents discussed as treatment options. Timing of meals and proximity of last meal to sleep were discussed. If symptoms persist, a formal gastrointestinal evaluation is needed. \par \par Problem 4: Abnormal CT of chest- c/w post inflammatory or infectious rather than malignant.-small nodules \par -?hypersensitivity pneumonitis\par -?TB\par -?sarcoidosis, \par -s/p f/u CT 6/2022\par -s/p HP panel, quantiferon gold, ACE level -\par CAT scans are the only radiological modality to identify abnormalities w/in the lungs with regards to nodules/masses/lymph nodes. Risks, benefits were reviewed in detail. The guidelines for abnormalities include follow up CT scans at various intervals which could range from 6 weeks to 1 year intervals. If there is a change for the worse then consideration for a biopsy will be considered if you are a candidate. Second opinion evaluation with a thoracic surgeon or an interventional radiologist could be offered. \par \par Problem 5: + ASAD (snoring, EDS, Elevated mallampati class, neck size) (mild)\par -s/p home sleep study (mild); DD vs Excite ; DD ([place])\par Sleep apnea is associated with adverse clinical consequences which can affect most organ systems.  Cardiovascular disease risk includes arrhythmias, atrial fibrillation, hypertension, coronary artery disease, and stroke. Metabolic disorders include diabetes type 2, non-alcoholic fatty liver disease. Mood disorder especially depression; and cognitive decline especially in the elderly. Associations with  chronic reflux/Kurtz’s esophagus some but not all inclusive. \par -Reasons  include arousal consistent with hypopnea; respiratory events most prominent in REM sleep or supine \par position; therefore sleep staging and body position are important for accurate diagnosis and estimation of AHI. \par \par Problem 6 s/p Covid 19 1/2022\par -s/p Covid 19 vaccine x3 \par \par problem 7: cardiac disease\par -recommended to continue to follow up with Cardiologist \par \par problem 8: poor breathing mechanics\par - Recommended Wim Hof and Buteyko techniques \par -Proper breathing techniques were reviewed with an emphasis of exhalation. Patient instructed to breath in for 1 second and out for four seconds. Patient was encouraged to not talk while walking. \par \par problem 9: out of shape / overweight\par -Weight loss, exercise, and diet control were discussed and are highly encouraged. Treatment options were given such as, aqua therapy, and contacting a nutritionist. Recommended to use the elliptical, stationary bike, less use of treadmill. Mindful eating was explained to the patient Obesity is associated with worsening asthma, shortness of breath, and potential for cardiac disease, diabetes, and other underlying medical conditions\par \par problem 10: health maintenance \par - Left Cervical lymph node - U/S \par -recommended yearly flu shot 2022 \par -recommended strep pneumonia vaccines: Prevnar-13 vaccine, followed by Pneumo vaccine 23 one year following after 65\par -recommended early intervention for Upper Respiratory Infections (URIs)\par -recommended regular osteoporosis evaluations\par -recommended early dermatological evaluations\par -recommended after the age of 50 to the age of 70, colonoscopy every 5 years\par \par F/U in 6-8 weeks.\par He is encouraged to call with any changes, concerns, or questions\par

## 2023-01-17 NOTE — ED ADULT NURSE NOTE - CHIEF COMPLAINT
Multiple absent-like seizures at facility. Last approximately 10-15 seconds. No post-ictal period.
The patient is a 66y Male complaining of see chief complaint quote.

## 2023-01-20 ENCOUNTER — NON-APPOINTMENT (OUTPATIENT)
Age: 68
End: 2023-01-20

## 2023-01-31 ENCOUNTER — RX RENEWAL (OUTPATIENT)
Age: 68
End: 2023-01-31

## 2023-01-31 RX ORDER — ALBUTEROL SULFATE 90 UG/1
108 (90 BASE) INHALANT RESPIRATORY (INHALATION)
Qty: 54 | Refills: 0 | Status: ACTIVE | COMMUNITY
Start: 2023-01-05 | End: 1900-01-01

## 2023-02-02 RX ORDER — MONTELUKAST 10 MG/1
10 TABLET, FILM COATED ORAL
Qty: 90 | Refills: 0 | Status: ACTIVE | COMMUNITY
Start: 2023-01-05 | End: 1900-01-01

## 2023-05-08 ENCOUNTER — RX RENEWAL (OUTPATIENT)
Age: 68
End: 2023-05-08

## 2023-05-10 ENCOUNTER — APPOINTMENT (OUTPATIENT)
Dept: PULMONOLOGY | Facility: CLINIC | Age: 68
End: 2023-05-10
Payer: MEDICARE

## 2023-05-10 ENCOUNTER — NON-APPOINTMENT (OUTPATIENT)
Age: 68
End: 2023-05-10

## 2023-05-10 VITALS
RESPIRATION RATE: 16 BRPM | HEIGHT: 71 IN | TEMPERATURE: 97.2 F | OXYGEN SATURATION: 98 % | WEIGHT: 200 LBS | SYSTOLIC BLOOD PRESSURE: 138 MMHG | HEART RATE: 70 BPM | DIASTOLIC BLOOD PRESSURE: 80 MMHG | BODY MASS INDEX: 28 KG/M2

## 2023-05-10 DIAGNOSIS — R09.82 POSTNASAL DRIP: ICD-10-CM

## 2023-05-10 DIAGNOSIS — Z23 ENCOUNTER FOR IMMUNIZATION: ICD-10-CM

## 2023-05-10 DIAGNOSIS — R05.3 CHRONIC COUGH: ICD-10-CM

## 2023-05-10 PROCEDURE — 90677 PCV20 VACCINE IM: CPT

## 2023-05-10 PROCEDURE — 94010 BREATHING CAPACITY TEST: CPT

## 2023-05-10 PROCEDURE — G0009: CPT

## 2023-05-10 PROCEDURE — 99214 OFFICE O/P EST MOD 30 MIN: CPT | Mod: 25

## 2023-05-10 PROCEDURE — 95012 NITRIC OXIDE EXP GAS DETER: CPT

## 2023-05-10 NOTE — ADDENDUM
[FreeTextEntry1] : Documented by Ismael Gavin acting as a scribe for Dr. Jono Zambrano on 05/10/2023 .\par \par All medical record entries made by the Scribe were at my, Dr. Jono Zambrano's, direction and personally dictated by me on 05/10/2023. I have reviewed the chart and agree that the record accurately reflects my personal performance of the history, physical exam, assessment and plan. I have also personally directed, reviewed, and agree with the discharge instructions.

## 2023-05-10 NOTE — HISTORY OF PRESENT ILLNESS
[TextBox_4] : Mr. HOOKS is a 67 year old male with a history of never smoking, HLD, HTN, cervical adenopathy, nonrheumatic mitral regurgitation, abnormal CT of chest, Covid 19 1/2022, anxiety, low vitamin D, chicken pox, measles, presenting to the office today for follow up pulmonary evaluation. His chief complaint is\par \par -he notes condition has improved \par -he notes recent EDS in the afternoon \par -he notes bowels are regular \par -he denies issues initiating sleep \par -he notes getting about 6 hrs of sleep but sleep is not always restful \par -he notes his memory and concentration are stable \par -he notes compliant with inhaler and tolerating well\par -he notes looking to lose weight \par \par \par -he denies any headaches, nausea, emesis, fever, chills, sweats, chest pain, chest pressure, coughing, wheezing, palpitations, constipation, diarrhea, vertigo, dysphagia, heartburn, reflux, itchy eyes, itchy ears, leg swelling, arthralgias, myalgias, or sour taste in the mouth.

## 2023-05-10 NOTE — ASSESSMENT
[FreeTextEntry1] : Mr. HOOKS is a 67 year old male with a history of never smoking HLD, HTN, cervical adenopathy, nonrheumatic mitral regurgitation, abnormal CT of chest, Covid 19 1/2022, anxiety, low vitamin D, chicken pox, measles, presenting to the office today for follow up pulmonary evaluation for chronic cough, abnormal CT of the chest, likely RADs proceeding covid 19 , (+)ASAD (mild) ; s/p URI / bronchospasm, (L) cervical lymph node- #1 issues is weight \par \par His shortness of breath is multifactorial due to:\par -poor mechanics of breathing \par -out of shape / overweight\par -pulmonary disease\par    -mild asthma \par -?cardiac disease \par \par His chronic cough is felt to be multifactorial due to:\par -Asthma / COPD\par -Post nasal drip syndrome\par -GERD ?\par -?medication (doubting lisinopril) \par \par problem 1: mild asthma \par -continue Breo Ellipta 200 at 1 inhalation QD \par - Add Singulair 10 mg QHS\par -Asthma is  believed to be caused by inherited (genetic) and environmental factor, but its exact cause is unknown. Asthma may be triggered by allergens, lung infections, or irritants in the air. Asthma triggers are different for each person \par -Inhaler technique reviewed as well as oral hygiene techniques reviewed with patient. Avoidance of cold air, extremes of temperature, rescue inhaler should be used before exercise. Order of medication reviewed with patient. Recommended use of a cool mist humidifier in the bedroom. \par \par probem 1A: Elevated TgE\par -Xolair if needed\par \par problem 2:allergies/ PND -\par -add olopatadine 0.6% 1 sniff BID/ saline\par -s/p Blood work to include:- asthma panel,- food IgE panel, IgE level (333), - eosinophil level, - vitamin D level \par -Environmental measures for allergies were encouraged including mattress and pillow covers, air purifier, and environmental controls. \par \par problem 3: Abnormal PFTs (elevated FENO 33 c/w inflammation) \par \par problem 3A: ?LPR/GERD/Globus \par -Add Pepcid 40 mg QHS \par - Things to avoid including overeating, spicy foods, tight clothing, eating within three hours of bed, this list is not all inclusive.\par - For treatment of reflux, possible options discussed including diet control, H2 blockers, PPIs, as well as coating motility agents discussed as treatment options. Timing of meals and proximity of last meal to sleep were discussed. If symptoms persist, a formal gastrointestinal evaluation is needed. \par \par Problem 4: Abnormal CT of chest- c/w post inflammatory or infectious rather than malignant.-small nodules \par -?hypersensitivity pneumonitis\par -?TB\par -?sarcoidosis, \par -s/p f/u CT 6/2022\par -s/p HP panel, quantiferon gold, ACE level -\par CAT scans are the only radiological modality to identify abnormalities w/in the lungs with regards to nodules/masses/lymph nodes. Risks, benefits were reviewed in detail. The guidelines for abnormalities include follow up CT scans at various intervals which could range from 6 weeks to 1 year intervals. If there is a change for the worse then consideration for a biopsy will be considered if you are a candidate. Second opinion evaluation with a thoracic surgeon or an interventional radiologist could be offered. \par \par Problem 5: + ASAD (snoring, EDS, Elevated mallampati class, neck size) (mild)\par -pending CPAP\par -s/p home sleep study (mild); DD vs Excite ; DD ([place])\par Sleep apnea is associated with adverse clinical consequences which can affect most organ systems.  Cardiovascular disease risk includes arrhythmias, atrial fibrillation, hypertension, coronary artery disease, and stroke. Metabolic disorders include diabetes type 2, non-alcoholic fatty liver disease. Mood disorder especially depression; and cognitive decline especially in the elderly. Associations with  chronic reflux/Kurtz’s esophagus some but not all inclusive. \par -Reasons  include arousal consistent with hypopnea; respiratory events most prominent in REM sleep or supine \par position; therefore sleep staging and body position are important for accurate diagnosis and estimation of AHI. \par \par Problem 6 s/p Covid 19 1/2022\par -s/p Covid 19 vaccine x3 \par \par problem 7: cardiac disease\par -recommended to continue to follow up with Cardiologist \par \par problem 8: poor breathing mechanics\par - Recommended Wim Hof and Buteyko techniques \par -Proper breathing techniques were reviewed with an emphasis of exhalation. Patient instructed to breath in for 1 second and out for four seconds. Patient was encouraged to not talk while walking. \par \par problem 9: out of shape / overweight\par -Weight loss, exercise, and diet control were discussed and are highly encouraged. Treatment options were given such as, aqua therapy, and contacting a nutritionist. Recommended to use the elliptical, stationary bike, less use of treadmill. Mindful eating was explained to the patient Obesity is associated with worsening asthma, shortness of breath, and potential for cardiac disease, diabetes, and other underlying medical conditions\par \par problem 10: health maintenance \par - Left Cervical lymph node - U/S \par -recommended yearly flu shot 2022 \par -recommended strep pneumonia vaccines: Prevnar-13 vaccine, followed by Pneumo vaccine 23 one year following after 65\par -recommended early intervention for Upper Respiratory Infections (URIs)\par -recommended regular osteoporosis evaluations\par -recommended early dermatological evaluations\par -recommended after the age of 50 to the age of 70, colonoscopy every 5 years\par \par F/U in 6-8 weeks.\par He is encouraged to call with any changes, concerns, or questions\par

## 2023-05-10 NOTE — PROCEDURE
[FreeTextEntry1] : PFT reveals normal flows, with an FEV1 of 3.55 L, which is 101% of predicted, with a normal flow volume loop.\par PFTs were performed to evaluate for asthma \par \par FENO was 17; a normal value being less than 25\par Fractional exhaled nitric oxide (FENO) is regarded as a simple, noninvasive method for assessing eosinophilic airway inflammation. Produced by a variety of cells within the lung, nitric oxide (NO) concentrations are generally low in healthy individuals. However, high concentrations of NO appear to be involved in nonspecific host defense mechanisms and chronic inflammatory diseases such as asthma. The American Thoracic Society (ATS) therefore has recommended using FENO to aid in the diagnosis and monitoring of eosinophilic airway inflammation and asthma, and for identifying steroid responsive individuals whose chronic respiratory symptoms may be caused by airway inflammation. \par FENO was 25; a normal value being less than 25\par Fractional exhaled nitric oxide (FENO) is regarded as a simple, noninvasive method for assessing eosinophilic airway inflammation. Produced by a variety of cells within the lung, nitric oxide (NO) concentrations are generally low in healthy individuals. However, high concentrations of NO appear to be involved in nonspecific host defense mechanisms and chronic inflammatory diseases such as asthma. The American Thoracic Society (ATS) therefore has recommended using FENO to aid in the diagnosis and monitoring of eosinophilic airway inflammation and asthma, and for identifying steroid responsive individuals whose chronic respiratory symptoms may be caused by airway inflammation.

## 2023-05-31 ENCOUNTER — APPOINTMENT (OUTPATIENT)
Dept: CT IMAGING | Facility: HOSPITAL | Age: 68
End: 2023-05-31
Payer: MEDICARE

## 2023-05-31 ENCOUNTER — RESULT REVIEW (OUTPATIENT)
Age: 68
End: 2023-05-31

## 2023-05-31 ENCOUNTER — OUTPATIENT (OUTPATIENT)
Dept: OUTPATIENT SERVICES | Facility: HOSPITAL | Age: 68
LOS: 1 days | End: 2023-05-31
Payer: MEDICARE

## 2023-05-31 DIAGNOSIS — R93.89 ABNORMAL FINDINGS ON DIAGNOSTIC IMAGING OF OTHER SPECIFIED BODY STRUCTURES: ICD-10-CM

## 2023-05-31 PROCEDURE — 71250 CT THORAX DX C-: CPT

## 2023-05-31 PROCEDURE — 71250 CT THORAX DX C-: CPT | Mod: 26,MH

## 2023-06-08 ENCOUNTER — NON-APPOINTMENT (OUTPATIENT)
Age: 68
End: 2023-06-08

## 2023-09-12 NOTE — ADDENDUM
[FreeTextEntry1] : Documented by Sara Gonzalez acting as a scribe for Dr. Jono Zambrano on 01/09/2023 \par \par All medical record entries made by the Scribe were at my, Dr. Jono Zambrano's, direction and personally dictated by me on 01/09/2023 . I have reviewed the chart and agree that the record accurately reflects my personal performance of the history, physical exam, assessment and plan. I have also personally directed, reviewed, and agree with the discharge instructions.  Griseofulvin Pregnancy And Lactation Text: This medication is Pregnancy Category X and is known to cause serious birth defects. It is unknown if this medication is excreted in breast milk but breast feeding should be avoided.

## 2023-09-13 ENCOUNTER — APPOINTMENT (OUTPATIENT)
Dept: PULMONOLOGY | Facility: CLINIC | Age: 68
End: 2023-09-13

## 2023-10-19 ENCOUNTER — TRANSCRIPTION ENCOUNTER (OUTPATIENT)
Age: 68
End: 2023-10-19

## 2023-10-19 DIAGNOSIS — R06.02 SHORTNESS OF BREATH: ICD-10-CM

## 2023-10-19 RX ORDER — FLUTICASONE FUROATE AND VILANTEROL TRIFENATATE 200; 25 UG/1; UG/1
200-25 POWDER RESPIRATORY (INHALATION)
Qty: 3 | Refills: 1 | Status: COMPLETED | COMMUNITY
Start: 2022-03-21 | End: 2023-10-19

## 2023-10-19 RX ORDER — CICLESONIDE 50 UG/1
50 SPRAY NASAL
Qty: 1 | Refills: 2 | Status: DISCONTINUED | COMMUNITY
Start: 2023-10-18 | End: 2023-10-19

## 2024-01-26 ENCOUNTER — APPOINTMENT (OUTPATIENT)
Dept: PULMONOLOGY | Facility: CLINIC | Age: 69
End: 2024-01-26
Payer: MEDICARE

## 2024-01-26 VITALS
HEART RATE: 82 BPM | SYSTOLIC BLOOD PRESSURE: 130 MMHG | TEMPERATURE: 97.3 F | DIASTOLIC BLOOD PRESSURE: 82 MMHG | WEIGHT: 204 LBS | RESPIRATION RATE: 18 BRPM | HEIGHT: 71 IN | BODY MASS INDEX: 28.56 KG/M2 | OXYGEN SATURATION: 93 %

## 2024-01-26 DIAGNOSIS — R06.83 SNORING: ICD-10-CM

## 2024-01-26 DIAGNOSIS — J45.909 UNSPECIFIED ASTHMA, UNCOMPLICATED: ICD-10-CM

## 2024-01-26 DIAGNOSIS — R94.2 ABNORMAL RESULTS OF PULMONARY FUNCTION STUDIES: ICD-10-CM

## 2024-01-26 DIAGNOSIS — R91.8 OTHER NONSPECIFIC ABNORMAL FINDING OF LUNG FIELD: ICD-10-CM

## 2024-01-26 DIAGNOSIS — R91.1 SOLITARY PULMONARY NODULE: ICD-10-CM

## 2024-01-26 DIAGNOSIS — R76.8 OTHER SPECIFIED ABNORMAL IMMUNOLOGICAL FINDINGS IN SERUM: ICD-10-CM

## 2024-01-26 PROCEDURE — 99214 OFFICE O/P EST MOD 30 MIN: CPT | Mod: 25

## 2024-01-26 PROCEDURE — 94010 BREATHING CAPACITY TEST: CPT

## 2024-01-26 PROCEDURE — 95012 NITRIC OXIDE EXP GAS DETER: CPT

## 2024-01-26 NOTE — PHYSICAL EXAM
[No Acute Distress] : no acute distress [Normal Oropharynx] : normal oropharynx [III] : Mallampati Class: III [No Neck Mass] : no neck mass [Normal Appearance] : normal appearance [Normal Rate/Rhythm] : normal rate/rhythm [Normal S1, S2] : normal s1, s2 [No Murmurs] : no murmurs [No Resp Distress] : no resp distress [Clear to Auscultation Bilaterally] : clear to auscultation bilaterally [No Abnormalities] : no abnormalities [Benign] : benign [Normal Gait] : normal gait [No Clubbing] : no clubbing [No Cyanosis] : no cyanosis [No Edema] : no edema [FROM] : FROM [Normal Color/ Pigmentation] : normal color/ pigmentation [No Focal Deficits] : no focal deficits [Oriented x3] : oriented x3 [Normal Affect] : normal affect [TextBox_68] : I:E ratio 1:3; clear

## 2024-01-26 NOTE — ADDENDUM
[FreeTextEntry1] : Documented by Sadiq Solomon acting as a scribe for Dr. Jono Zambrano on 01/26/2024. All medical record entries made by the Scribe were at my, Dr. Jono Zambrano's, direction and personally dictated by me on 01/26/2024. I have reviewed the chart and agree that the record accurately reflects my personal performance of the history, physical exam, assessment and plan. I have also personally directed, reviewed, and agree with the discharge instructions.

## 2024-01-26 NOTE — ASSESSMENT
[FreeTextEntry1] : Mr. HOOKS is a 68 year old male with a history of never smoking HLD, HTN, cervical adenopathy, nonrheumatic mitral regurgitation, abnormal CT of chest, Covid 19 1/2022, anxiety, low vitamin D, chicken pox, measles, presenting to the office today for follow up pulmonary evaluation for chronic cough, abnormal CT of the chest, likely RADs proceeding covid 19 , (+)ASAD (mild) ; s/p URI / bronchospasm, (L) cervical lymph node- #1 issues is weight (still)  His shortness of breath is multifactorial due to: -poor mechanics of breathing  -out of shape / overweight -pulmonary disease    -mild asthma  -?cardiac disease   His chronic cough is felt to be multifactorial due to: -Asthma / COPD -Post nasal drip syndrome -GERD ? -?medication (doubting lisinopril)   problem 1: mild asthma  -continue Breo Ellipta 200 at 1 inhalation QD or Trelegy ( 200 ) 1 puff QD - Add Singulair 10 mg QHS -Asthma is  believed to be caused by inherited (genetic) and environmental factor, but its exact cause is unknown. Asthma may be triggered by allergens, lung infections, or irritants in the air. Asthma triggers are different for each person  -Inhaler technique reviewed as well as oral hygiene techniques reviewed with patient. Avoidance of cold air, extremes of temperature, rescue inhaler should be used before exercise. Order of medication reviewed with patient. Recommended use of a cool mist humidifier in the bedroom.   problem 1A: Elevated IgE -Xolair if needed -Xolair is a recombinant DNA- derived humanized IgG1K monoclonal antibody that selectively binds to human immunoglobulin E (IgE). Xolair is produced by a Chinese hamster ovary cell suspension culture in nutrient medium containing the antibiotic gentamicin. Gentamicin is not detectable in the final product. Xolair is a sterile, white, preservative free, lyophilized powder contained in a single use vial that is reconstituted with sterile water for suspension. Side effects include: wheezing, tightness of the chest, trouble breathing, hives, skin rash, feeling anxious or light-headed, fainting, warmth or tingling under skin, or swelling of face, lips, or tongue.  problem 2:allergies/ PND - -add olopatadine 0.6% 1 sniff BID/ saline -s/p Blood work to include:- asthma panel,- food IgE panel, IgE level (333), - eosinophil level, - vitamin D level  -Environmental measures for allergies were encouraged including mattress and pillow covers, air purifier, and environmental controls.   problem 3: Abnormal PFTs (elevated FENO 33 c/w inflammation)   problem 3A: ?LPR/GERD/Globus  -Add Pepcid 40 mg QHS  - Things to avoid including overeating, spicy foods, tight clothing, eating within three hours of bed, this list is not all inclusive. - For treatment of reflux, possible options discussed including diet control, H2 blockers, PPIs, as well as coating motility agents discussed as treatment options. Timing of meals and proximity of last meal to sleep were discussed. If symptoms persist, a formal gastrointestinal evaluation is needed.   Problem 4: Abnormal CT of chest- c/w post inflammatory or infectious rather than malignant.-small nodules  -?hypersensitivity pneumonitis -?TB -?sarcoidosis,  -s/p f/u CT 6/2022 -s/p HP panel, quantiferon gold, ACE level - CAT scans are the only radiological modality to identify abnormalities w/in the lungs with regards to nodules/masses/lymph nodes. Risks, benefits were reviewed in detail. The guidelines for abnormalities include follow up CT scans at various intervals which could range from 6 weeks to 1 year intervals. If there is a change for the worse then consideration for a biopsy will be considered if you are a candidate. Second opinion evaluation with a thoracic surgeon or an interventional radiologist could be offered.   Problem 5: + ASAD (snoring, EDS, Elevated mallampati class, neck size) (mild) -pending CPAP -s/p home sleep study (mild); DD vs Excite ; DD ([place]) Sleep apnea is associated with adverse clinical consequences which can affect most organ systems.  Cardiovascular disease risk includes arrhythmias, atrial fibrillation, hypertension, coronary artery disease, and stroke. Metabolic disorders include diabetes type 2, non-alcoholic fatty liver disease. Mood disorder especially depression; and cognitive decline especially in the elderly. Associations with  chronic reflux/Kurtz's esophagus some but not all inclusive.  -Reasons  include arousal consistent with hypopnea; respiratory events most prominent in REM sleep or supine  position; therefore sleep staging and body position are important for accurate diagnosis and estimation of AHI.   Problem 6 s/p Covid 19 1/2022 -s/p Covid 19 vaccine x3   problem 7: cardiac disease -recommended to continue to follow up with Cardiologist   problem 8: poor breathing mechanics - Recommended Romulo Stanley and Ephraim techniques  -Proper breathing techniques were reviewed with an emphasis of exhalation. Patient instructed to breath in for 1 second and out for four seconds. Patient was encouraged to not talk while walking.   problem 9: out of shape / overweight -recommended Berberine OTC -Weight loss, exercise, and diet control were discussed and are highly encouraged. Treatment options were given such as, aqua therapy, and contacting a nutritionist. Recommended to use the elliptical, stationary bike, less use of treadmill. Mindful eating was explained to the patient Obesity is associated with worsening asthma, shortness of breath, and potential for cardiac disease, diabetes, and other underlying medical conditions  problem 10: health maintenance  - Left Cervical lymph node - U/S  -recommended yearly flu shot 2022  -recommended strep pneumonia vaccines: Prevnar-13 vaccine, followed by Pneumo vaccine 23 one year following after 65 -recommended early intervention for Upper Respiratory Infections (URIs) -recommended regular osteoporosis evaluations -recommended early dermatological evaluations -recommended after the age of 50 to the age of 70, colonoscopy every 5 years  F/U in 6-8 weeks. He is encouraged to call with any changes, concerns, or questions

## 2024-01-26 NOTE — HISTORY OF PRESENT ILLNESS
[TextBox_4] : Mr. HOOKS is a 68 year old male with a history of never smoking, HLD, HTN, cervical adenopathy, nonrheumatic mitral regurgitation, abnormal CT of chest, Covid 19 1/2022, anxiety, low vitamin D, chicken pox, measles, presenting to the office today for follow up pulmonary evaluation. His chief complaint is  -he notes feeling generally well -he notes weight is stable -he notes bowels are regular -he denies exercising -he notes working out in November but has since stopped (weight lifting, squats, stretches) -he notes poor quality of sleep -he notes fatigue  -he notes ability to fall asleep while watching TV -he notes snoring  -he notes nocturia  -he notes his memory and concentration are stable  -he denies any headaches, nausea, emesis, fever, chills, sweats, chest pain, chest pressure, coughing, wheezing, palpitations, diarrhea, constipation, dysphagia, vertigo, arthralgias, myalgias, leg swelling, itchy eyes, itchy ears, heartburn, reflux, or sour taste in the mouth.

## 2024-01-26 NOTE — PROCEDURE
[FreeTextEntry1] : PFTs revealed normal flows; FEV1 was  3.49L, which is 99.7% of predicted; abnormal inspiratory limb.  PFTs were performed to evaluate for SOB  FENO was 15; a normal value being less than 25 Fractional exhaled nitric oxide (FENO) is regarded as a simple, noninvasive method for assessing eosinophilic airway inflammation. Produced by a variety of cells within the lung, nitric oxide (NO) concentrations are generally low in healthy individuals. However, high concentrations of NO appear to be involved in nonspecific host defense mechanisms and chronic inflammatory diseases such as asthma. The American Thoracic Society (ATS) therefore has recommended using FENO to aid in the diagnosis and monitoring of eosinophilic airway inflammation and asthma, and for identifying steroid responsive individuals whose chronic respiratory symptoms may be caused by airway inflammation.   The American Thoracic Society (ATS) strongly recommends the use of FeNO measurement to aid in the assessment, management, and long-term monitoring of asthma. In their 2011 clinical practice guideline, the ATS emphasizes the importance of using FeNO.

## 2024-03-04 ENCOUNTER — APPOINTMENT (OUTPATIENT)
Dept: FAMILY MEDICINE | Facility: CLINIC | Age: 69
End: 2024-03-04
Payer: MEDICARE

## 2024-03-04 ENCOUNTER — NON-APPOINTMENT (OUTPATIENT)
Age: 69
End: 2024-03-04

## 2024-03-04 VITALS — SYSTOLIC BLOOD PRESSURE: 138 MMHG | DIASTOLIC BLOOD PRESSURE: 72 MMHG

## 2024-03-04 VITALS
HEART RATE: 78 BPM | WEIGHT: 206 LBS | SYSTOLIC BLOOD PRESSURE: 148 MMHG | TEMPERATURE: 97.5 F | OXYGEN SATURATION: 97 % | DIASTOLIC BLOOD PRESSURE: 76 MMHG | RESPIRATION RATE: 16 BRPM | BODY MASS INDEX: 28.84 KG/M2 | HEIGHT: 71 IN

## 2024-03-04 DIAGNOSIS — G47.33 OBSTRUCTIVE SLEEP APNEA (ADULT) (PEDIATRIC): ICD-10-CM

## 2024-03-04 DIAGNOSIS — K21.9 GASTRO-ESOPHAGEAL REFLUX DISEASE W/OUT ESOPHAGITIS: ICD-10-CM

## 2024-03-04 DIAGNOSIS — I10 ESSENTIAL (PRIMARY) HYPERTENSION: ICD-10-CM

## 2024-03-04 DIAGNOSIS — Z00.00 ENCOUNTER FOR GENERAL ADULT MEDICAL EXAMINATION W/OUT ABNORMAL FINDINGS: ICD-10-CM

## 2024-03-04 DIAGNOSIS — M54.2 CERVICALGIA: ICD-10-CM

## 2024-03-04 DIAGNOSIS — E78.5 HYPERLIPIDEMIA, UNSPECIFIED: ICD-10-CM

## 2024-03-04 DIAGNOSIS — C84.A0 CUTANEOUS T-CELL LYMPHOMA, UNSPECIFIED, UNSPECIFIED SITE: ICD-10-CM

## 2024-03-04 DIAGNOSIS — R93.89 ABNORMAL FINDINGS ON DIAGNOSTIC IMAGING OF OTHER SPECIFIED BODY STRUCTURES: ICD-10-CM

## 2024-03-04 PROCEDURE — 93000 ELECTROCARDIOGRAM COMPLETE: CPT

## 2024-03-04 PROCEDURE — G0439: CPT

## 2024-03-04 NOTE — HISTORY OF PRESENT ILLNESS
[FreeTextEntry1] : Annual wellness exam. [de-identified] : Patient is a 68-year-old gentleman who presents today for annual wellness exam patient states that he is been in his usual state of health presently the patient is awake alert and oriented x 3 in no acute distress calm and cooperative.  At today's visit I will be obtaining comprehensive blood work and electrocardiogram.  Patient's medical history significant for hypertension, reflux, patient is seen on a regular basis by pulmonology Dr. Jono Zambrano reviewed CT scan, pulmonary function test, etc. 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

## 2024-03-04 NOTE — PHYSICAL EXAM
[Well Nourished] : well nourished [No Acute Distress] : no acute distress [Well Developed] : well developed [Well-Appearing] : well-appearing [Normal Voice/Communication] : normal voice/communication [Normal Sclera/Conjunctiva] : normal sclera/conjunctiva [PERRL] : pupils equal round and reactive to light [EOMI] : extraocular movements intact [Normal Outer Ear/Nose] : the outer ears and nose were normal in appearance [Normal Oropharynx] : the oropharynx was normal [Normal TMs] : both tympanic membranes were normal [No JVD] : no jugular venous distention [No Lymphadenopathy] : no lymphadenopathy [Supple] : supple [Thyroid Normal, No Nodules] : the thyroid was normal and there were no nodules present [No Accessory Muscle Use] : no accessory muscle use [No Respiratory Distress] : no respiratory distress  [Clear to Auscultation] : lungs were clear to auscultation bilaterally [Normal Rate] : normal rate  [Regular Rhythm] : with a regular rhythm [Normal S1, S2] : normal S1 and S2 [No Murmur] : no murmur heard [No Carotid Bruits] : no carotid bruits [No Abdominal Bruit] : a ~M bruit was not heard ~T in the abdomen [No Varicosities] : no varicosities [Pedal Pulses Present] : the pedal pulses are present [No Edema] : there was no peripheral edema [No Palpable Aorta] : no palpable aorta [No Extremity Clubbing/Cyanosis] : no extremity clubbing/cyanosis [Soft] : abdomen soft [Non Tender] : non-tender [Non-distended] : non-distended [No HSM] : no HSM [No Masses] : no abdominal mass palpated [Normal Bowel Sounds] : normal bowel sounds [No Mass] : no mass [Rectal Exam - Rectum] : digital rectal exam was normal [Anus Abnormality] : the anus and perineum were normal [No Prostate Nodules] : no prostate nodules [Normal Posterior Cervical Nodes] : no posterior cervical lymphadenopathy [Normal Anterior Cervical Nodes] : no anterior cervical lymphadenopathy [No CVA Tenderness] : no CVA  tenderness [No Spinal Tenderness] : no spinal tenderness [No Joint Swelling] : no joint swelling [Grossly Normal Strength/Tone] : grossly normal strength/tone [No Rash] : no rash [Coordination Grossly Intact] : coordination grossly intact [No Focal Deficits] : no focal deficits [Normal Gait] : normal gait [Deep Tendon Reflexes (DTR)] : deep tendon reflexes were 2+ and symmetric [Speech Grossly Normal] : speech grossly normal [Normal Affect] : the affect was normal [Memory Grossly Normal] : memory grossly normal [Normal Mood] : the mood was normal [Alert and Oriented x3] : oriented to person, place, and time [Normal Insight/Judgement] : insight and judgment were intact

## 2024-03-04 NOTE — HEALTH RISK ASSESSMENT
[Very Good] : ~his/her~  mood as very good [Yes] : Yes [2 - 4 times a month (2 pts)] : 2-4 times a month (2 points) [1 or 2 (0 pts)] : 1 or 2 (0 points) [Never (0 pts)] : Never (0 points) [No] : In the past 12 months have you used drugs other than those required for medical reasons? No [No falls in past year] : Patient reported no falls in the past year [Little interest or pleasure doing things] : 1) Little interest or pleasure doing things [Feeling down, depressed, or hopeless] : 2) Feeling down, depressed, or hopeless [0] : 2) Feeling down, depressed, or hopeless: Not at all (0) [PHQ-2 Negative - No further assessment needed] : PHQ-2 Negative - No further assessment needed [[id number]] : 0 [Audit-CScore] : 2 [Patient reported colonoscopy was normal] : Patient reported colonoscopy was normal [HIV test declined] : HIV test declined [Hepatitis C test offered] : Hepatitis C test offered [Change in mental status noted] : No change in mental status noted [Language] : denies difficulty with language [Behavior] : denies difficulty with behavior [Learning/Retaining New Information] : denies difficulty learning/retaining new information [Handling Complex Tasks] : denies difficulty handling complex tasks [Reasoning] : denies difficulty with reasoning [Spatial Ability and Orientation] : denies difficulty with spatial ability and orientation [None] : None [With Family] : lives with family [# of Members in Household ___] :  household currently consist of [unfilled] member(s) [Employed] : employed [] :  [College] : College [Sexually Active] : sexually active [# Of Children ___] : has [unfilled] children [High Risk Behavior] : no high risk behavior [Feels Safe at Home] : Feels safe at home [Fully functional (using the telephone, shopping, preparing meals, housekeeping, doing laundry, using] : Fully functional and needs no help or supervision to perform IADLs (using the telephone, shopping, preparing meals, housekeeping, doing laundry, using transportation, managing medications and managing finances) [Fully functional (bathing, dressing, toileting, transferring, walking, feeding)] : Fully functional (bathing, dressing, toileting, transferring, walking, feeding) [Reports changes in hearing] : Reports no changes in hearing [Reports changes in vision] : Reports no changes in vision [Reports normal functional visual acuity (ie: able to read med bottle)] : Reports poor functional visual acuity.  [Smoke Detector] : smoke detector [Reports changes in dental health] : Reports no changes in dental health [Carbon Monoxide Detector] : carbon monoxide detector [Guns at Home] : no guns at home [Safety elements used in home] : safety elements used in home [Seat Belt] :  uses seat belt [Sunscreen] : uses sunscreen [TB Exposure] : is not being exposed to tuberculosis [Travel to Developing Areas] : does not  travel to developing areas [Caregiver Concerns] : does not have caregiver concerns [ColonoscopyDate] : 10/22 [ColonoscopyComments] : FU 10/32 [With Patient/Caregiver] : , with patient/caregiver [de-identified] : Glasses [Reviewed no changes] : Reviewed, no changes [Designated Healthcare Proxy] : Designated healthcare proxy [Name: ___] : Health Care Proxy's Name: [unfilled]  [Relationship: ___] : Relationship: [unfilled] [Aggressive treatment] : aggressive treatment [I will adhere to the patient's wishes.] : I will adhere to the patient's wishes. [AdvancecareDate] : 03/24 [Never] : Never [de-identified] : Exposed to secondhand smoke

## 2024-03-04 NOTE — ASSESSMENT
[FreeTextEntry1] : Assessment and plan:  1.  Comprehensive annual wellness exam no acute findings stable for patient.  2.  Electrocardiogram shows no acute ST-T wave changes compared to previous.  3.  Patient will be going for comprehensive blood work at lab fasting.  4.  Abnormal CT scan of the lungs, lung nodules, obstructive sleep apnea, all stable reviewed most recent CT scan of chest with patient stable compared to previous done this past May that is of 2023.  Patient is followed closely by Dr. Jono Zambrano most recent consultation reviewed.  5.  Patient is up-to-date with colonoscopy to be scheduled for colonoscopy in 2032.  6.  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  7.  Continue present medical management and lifestyle.

## 2024-03-04 NOTE — COUNSELING
[Fall prevention counseling provided] : Fall prevention counseling provided [Adequate lighting] : Adequate lighting [Use proper foot wear] : Use proper foot wear [No throw rugs] : No throw rugs [Behavioral health counseling provided] : Behavioral health counseling provided [Sleep ___ hours/day] : Sleep [unfilled] hours/day [Engage in a relaxing activity] : Engage in a relaxing activity [Plan in advance] : Plan in advance [AUDIT-C Screening administered and reviewed] : AUDIT-C Screening administered and reviewed [Potential consequences of obesity discussed] : Potential consequences of obesity discussed [Benefits of weight loss discussed] : Benefits of weight loss discussed [Structured Weight Management Program suggested:] : Structured weight management program suggested [Encouraged to maintain food diary] : Encouraged to maintain food diary [Encouraged to increase physical activity] : Encouraged to increase physical activity [Encouraged to use exercise tracking device] : Encouraged to use exercise tracking device [Target Wt Loss Goal ___] : Weight Loss Goals: Target weight loss goal [unfilled] lbs [Weigh Self Weekly] : weigh self weekly [Decrease Portions] : decrease portions [____ min/wk Activity] : [unfilled] min/wk activity [Keep Food Diary] : keep food diary [None] : None [Good understanding] : Patient has a good understanding of lifestyle changes and steps needed to achieve self management goal

## 2024-03-06 DIAGNOSIS — N52.9 MALE ERECTILE DYSFUNCTION, UNSPECIFIED: ICD-10-CM

## 2024-03-06 LAB
ALBUMIN SERPL ELPH-MCNC: 4.6 G/DL
ALP BLD-CCNC: 83 U/L
ALT SERPL-CCNC: 21 U/L
ANION GAP SERPL CALC-SCNC: 11 MMOL/L
APPEARANCE: CLEAR
AST SERPL-CCNC: 21 U/L
BACTERIA: NEGATIVE /HPF
BASOPHILS # BLD AUTO: 0.04 K/UL
BASOPHILS NFR BLD AUTO: 0.6 %
BILIRUB SERPL-MCNC: 1 MG/DL
BILIRUBIN URINE: NEGATIVE
BLOOD URINE: NEGATIVE
BUN SERPL-MCNC: 23 MG/DL
CALCIUM SERPL-MCNC: 9.4 MG/DL
CAST: 0 /LPF
CHLORIDE SERPL-SCNC: 103 MMOL/L
CHOLEST SERPL-MCNC: 205 MG/DL
CO2 SERPL-SCNC: 28 MMOL/L
COLOR: YELLOW
CREAT SERPL-MCNC: 1.31 MG/DL
EGFR: 60 ML/MIN/1.73M2
EOSINOPHIL # BLD AUTO: 0.17 K/UL
EOSINOPHIL NFR BLD AUTO: 2.5 %
EPITHELIAL CELLS: 0 /HPF
GLUCOSE QUALITATIVE U: NEGATIVE MG/DL
GLUCOSE SERPL-MCNC: 96 MG/DL
HCT VFR BLD CALC: 44.2 %
HCV AB SER QL: NONREACTIVE
HCV S/CO RATIO: 0.05 S/CO
HDLC SERPL-MCNC: 59 MG/DL
HGB BLD-MCNC: 15.4 G/DL
IMM GRANULOCYTES NFR BLD AUTO: 0.3 %
KETONES URINE: NEGATIVE MG/DL
LDLC SERPL CALC-MCNC: 132 MG/DL
LEUKOCYTE ESTERASE URINE: NEGATIVE
LYMPHOCYTES # BLD AUTO: 1.3 K/UL
LYMPHOCYTES NFR BLD AUTO: 19 %
MAN DIFF?: NORMAL
MCHC RBC-ENTMCNC: 30.6 PG
MCHC RBC-ENTMCNC: 34.8 GM/DL
MCV RBC AUTO: 87.9 FL
MICROSCOPIC-UA: NORMAL
MONOCYTES # BLD AUTO: 0.45 K/UL
MONOCYTES NFR BLD AUTO: 6.6 %
NEUTROPHILS # BLD AUTO: 4.85 K/UL
NEUTROPHILS NFR BLD AUTO: 71 %
NITRITE URINE: NEGATIVE
NONHDLC SERPL-MCNC: 146 MG/DL
PH URINE: 6.5
PLATELET # BLD AUTO: 221 K/UL
POTASSIUM SERPL-SCNC: 4.6 MMOL/L
PROT SERPL-MCNC: 7 G/DL
PROTEIN URINE: NEGATIVE MG/DL
PSA SERPL-MCNC: 1.01 NG/ML
RBC # BLD: 5.03 M/UL
RBC # FLD: 13.5 %
RED BLOOD CELLS URINE: 0 /HPF
SODIUM SERPL-SCNC: 142 MMOL/L
SPECIFIC GRAVITY URINE: 1.02
TRIGL SERPL-MCNC: 75 MG/DL
UROBILINOGEN URINE: 0.2 MG/DL
WBC # FLD AUTO: 6.83 K/UL
WHITE BLOOD CELLS URINE: 0 /HPF

## 2024-03-08 LAB
HSV 1+2 IGG SER IA-IMP: NEGATIVE
HSV 1+2 IGG SER IA-IMP: POSITIVE
HSV1 IGG SER QL: 8.65 INDEX
HSV2 IGG SER QL: 0.04 INDEX

## 2024-05-02 ENCOUNTER — RX RENEWAL (OUTPATIENT)
Age: 69
End: 2024-05-02

## 2024-05-03 ENCOUNTER — TRANSCRIPTION ENCOUNTER (OUTPATIENT)
Age: 69
End: 2024-05-03

## 2024-05-03 RX ORDER — AMLODIPINE BESYLATE 10 MG/1
10 TABLET ORAL
Qty: 90 | Refills: 3 | Status: ACTIVE | COMMUNITY
Start: 2021-10-23 | End: 1900-01-01

## 2024-06-03 ENCOUNTER — TRANSCRIPTION ENCOUNTER (OUTPATIENT)
Age: 69
End: 2024-06-03

## 2024-06-03 RX ORDER — TADALAFIL 20 MG/1
20 TABLET ORAL
Qty: 18 | Refills: 3 | Status: ACTIVE | COMMUNITY
Start: 2024-03-06 | End: 1900-01-01

## 2024-06-03 RX ORDER — SILDENAFIL 100 MG/1
100 TABLET, FILM COATED ORAL
Qty: 18 | Refills: 3 | Status: ACTIVE | COMMUNITY
Start: 2024-06-03 | End: 1900-01-01

## 2024-06-26 RX ORDER — FLUTICASONE FUROATE, UMECLIDINIUM BROMIDE AND VILANTEROL TRIFENATATE 100; 62.5; 25 UG/1; UG/1; UG/1
100-62.5-25 POWDER RESPIRATORY (INHALATION)
Qty: 60 | Refills: 2 | Status: ACTIVE | COMMUNITY
Start: 2023-10-19 | End: 1900-01-01

## 2024-07-31 ENCOUNTER — APPOINTMENT (OUTPATIENT)
Dept: PULMONOLOGY | Facility: CLINIC | Age: 69
End: 2024-07-31
Payer: MEDICARE

## 2024-07-31 VITALS
BODY MASS INDEX: 28.14 KG/M2 | TEMPERATURE: 98.3 F | RESPIRATION RATE: 16 BRPM | HEART RATE: 67 BPM | OXYGEN SATURATION: 98 % | DIASTOLIC BLOOD PRESSURE: 70 MMHG | WEIGHT: 201 LBS | HEIGHT: 71 IN | SYSTOLIC BLOOD PRESSURE: 146 MMHG

## 2024-07-31 DIAGNOSIS — J45.909 UNSPECIFIED ASTHMA, UNCOMPLICATED: ICD-10-CM

## 2024-07-31 DIAGNOSIS — K21.9 GASTRO-ESOPHAGEAL REFLUX DISEASE W/OUT ESOPHAGITIS: ICD-10-CM

## 2024-07-31 DIAGNOSIS — G47.33 OBSTRUCTIVE SLEEP APNEA (ADULT) (PEDIATRIC): ICD-10-CM

## 2024-07-31 DIAGNOSIS — R93.89 ABNORMAL FINDINGS ON DIAGNOSTIC IMAGING OF OTHER SPECIFIED BODY STRUCTURES: ICD-10-CM

## 2024-07-31 DIAGNOSIS — R06.02 SHORTNESS OF BREATH: ICD-10-CM

## 2024-07-31 DIAGNOSIS — R94.2 ABNORMAL RESULTS OF PULMONARY FUNCTION STUDIES: ICD-10-CM

## 2024-07-31 DIAGNOSIS — R76.8 OTHER SPECIFIED ABNORMAL IMMUNOLOGICAL FINDINGS IN SERUM: ICD-10-CM

## 2024-07-31 PROCEDURE — 99214 OFFICE O/P EST MOD 30 MIN: CPT | Mod: 25

## 2024-07-31 PROCEDURE — 94727 GAS DIL/WSHOT DETER LNG VOL: CPT

## 2024-07-31 PROCEDURE — 94010 BREATHING CAPACITY TEST: CPT

## 2024-07-31 PROCEDURE — 95012 NITRIC OXIDE EXP GAS DETER: CPT

## 2024-07-31 PROCEDURE — 94729 DIFFUSING CAPACITY: CPT

## 2024-07-31 PROCEDURE — ZZZZZ: CPT

## 2024-07-31 NOTE — PROCEDURE
[FreeTextEntry1] : Full PFT reveals normal flows; FEV1 was 3.13 L which is 90% of predicted; normal lung volumes; normal diffusion at 25.8, which is 110% of predicted; normal flow volume loop. PFTs were performed to evaluate for SOB  FENO was 13; a normal value being less than 25 Fractional exhaled nitric oxide (FENO) is regarded as a simple, noninvasive method for assessing eosinophilic airway inflammation. Produced by a variety of cells within the lung, nitric oxide (NO) concentrations are generally low in healthy individuals. However, high concentrations of NO appear to be involved in nonspecific host defense mechanisms and chronic inflammatory diseases such as asthma. The American Thoracic Society (ATS) therefore has recommended using FENO to aid in the diagnosis and monitoring of eosinophilic airway inflammation and asthma, and for identifying steroid responsive individuals whose chronic respiratory symptoms may be caused by airway inflammation.   The American Thoracic Society (ATS) strongly recommends the use of FeNO measurement to aid in the assessment, management, and long-term monitoring of asthma. In their 2011 clinical practice guideline, the ATS emphasizes the importance of using FeNO.

## 2024-07-31 NOTE — ADDENDUM
[FreeTextEntry1] : Documented by Sadiq Solomon acting as a scribe for Dr. Jono Zambrano on 07/31/2024. All medical record entries made by the Scribe were at my, Dr. Jono Zambrano's, direction and personally dictated by me on 07/31/2024. I have reviewed the chart and agree that the record accurately reflects my personal performance of the history, physical exam, assessment and plan. I have also personally directed, reviewed, and agree with the discharge instructions.

## 2024-07-31 NOTE — HISTORY OF PRESENT ILLNESS
[TextBox_4] : Mr. HOOKS is a 69 year old male with a history of never smoking, HLD, HTN, cervical adenopathy, nonrheumatic mitral regurgitation, abnormal CT of chest, Covid 19 1/2022, anxiety, low vitamin D, chicken pox, measles, presenting to the office today for follow up pulmonary evaluation. His chief complaint is  -he notes stress -he notes GERD Sx -he denies exercising -he notes bowels are regular -he notes poor quality of sleep -he notes nocturia  -he notes intermittent ankle swelling  -he notes diet is good -he notes appetite is stable -he notes needing to improve his sleep   -he denies any headaches, nausea, emesis, fever, chills, sweats, chest pain, chest pressure, coughing, wheezing, palpitations, diarrhea, constipation, dysphagia, vertigo, arthralgias, myalgias, leg swelling, itchy eyes, itchy ears, heartburn, reflux, or sour taste in the mouth.

## 2024-07-31 NOTE — ASSESSMENT
[FreeTextEntry1] : Mr. HOOKS is a 68 year old male with a history of never smoking HLD, HTN, cervical adenopathy, nonrheumatic mitral regurgitation, abnormal CT of chest, Covid 19 1/2022, anxiety, low vitamin D, chicken pox, measles, presenting to the office today for follow up pulmonary evaluation for chronic cough, abnormal CT of the chest, likely RADs proceeding covid 19 , (+)ASAD (mild) ; s/p URI / bronchospasm, (L) cervical lymph node- #1 issues is weight (still), #2 untreated OSAS   His shortness of breath is multifactorial due to: -poor mechanics of breathing  -out of shape / overweight -pulmonary disease    -mild asthma  -?cardiac disease   His chronic cough is felt to be multifactorial due to: -Asthma / COPD -Post nasal drip syndrome -GERD ? -?medication (doubting lisinopril)   problem 1: mild asthma  -continue Breo Ellipta 200 at 1 inhalation QD or Trelegy ( 200 ) 1 puff QD - Add Singulair 10 mg QHS -Asthma is  believed to be caused by inherited (genetic) and environmental factor, but its exact cause is unknown. Asthma may be triggered by allergens, lung infections, or irritants in the air. Asthma triggers are different for each person  -Inhaler technique reviewed as well as oral hygiene techniques reviewed with patient. Avoidance of cold air, extremes of temperature, rescue inhaler should be used before exercise. Order of medication reviewed with patient. Recommended use of a cool mist humidifier in the bedroom.   problem 1A: Elevated IgE -Xolair if needed -Xolair is a recombinant DNA- derived humanized IgG1K monoclonal antibody that selectively binds to human immunoglobulin E (IgE). Xolair is produced by a Chinese hamster ovary cell suspension culture in nutrient medium containing the antibiotic gentamicin. Gentamicin is not detectable in the final product. Xolair is a sterile, white, preservative free, lyophilized powder contained in a single use vial that is reconstituted with sterile water for suspension. Side effects include: wheezing, tightness of the chest, trouble breathing, hives, skin rash, feeling anxious or light-headed, fainting, warmth or tingling under skin, or swelling of face, lips, or tongue.  problem 2:allergies/ PND - -add olopatadine 0.6% 1 sniff BID/ saline -s/p Blood work to include:- asthma panel,- food IgE panel, IgE level (333), - eosinophil level, - vitamin D level  -Environmental measures for allergies were encouraged including mattress and pillow covers, air purifier, and environmental controls.   problem 3: Abnormal PFTs (elevated FENO 33 c/w inflammation)   problem 3A: ?LPR/GERD/Globus  -Add Pepcid 40 mg QHS  - Things to avoid including overeating, spicy foods, tight clothing, eating within three hours of bed, this list is not all inclusive. - For treatment of reflux, possible options discussed including diet control, H2 blockers, PPIs, as well as coating motility agents discussed as treatment options. Timing of meals and proximity of last meal to sleep were discussed. If symptoms persist, a formal gastrointestinal evaluation is needed.   Problem 4: Abnormal CT of chest- c/w post inflammatory or infectious rather than malignant.-small nodules  -?hypersensitivity pneumonitis -?TB -?sarcoidosis,  -s/p f/u CT 6/2022 -s/p HP panel, quantiferon gold, ACE level - CAT scans are the only radiological modality to identify abnormalities w/in the lungs with regards to nodules/masses/lymph nodes. Risks, benefits were reviewed in detail. The guidelines for abnormalities include follow up CT scans at various intervals which could range from 6 weeks to 1 year intervals. If there is a change for the worse then consideration for a biopsy will be considered if you are a candidate. Second opinion evaluation with a thoracic surgeon or an interventional radiologist could be offered.   Problem 5: + ASAD (snoring, EDS, Elevated mallampati class, neck size) (mild) -s/p home sleep study (mild); DD vs Excite ; DD ([place]) Sleep apnea is associated with adverse clinical consequences which can affect most organ systems.  Cardiovascular disease risk includes arrhythmias, atrial fibrillation, hypertension, coronary artery disease, and stroke. Metabolic disorders include diabetes type 2, non-alcoholic fatty liver disease. Mood disorder especially depression; and cognitive decline especially in the elderly. Associations with  chronic reflux/Kurtz's esophagus some but not all inclusive.  -Reasons  include arousal consistent with hypopnea; respiratory events most prominent in REM sleep or supine  position; therefore sleep staging and body position are important for accurate diagnosis and estimation of AHI.   Problem 6 s/p Covid 19 1/2022 -s/p Covid 19 vaccine x3   problem 7: cardiac disease -recommended to continue to follow up with Cardiologist   problem 8: poor breathing mechanics - Recommended Romulo Stanley and Ephraim techniques  -Proper breathing techniques were reviewed with an emphasis of exhalation. Patient instructed to breath in for 1 second and out for four seconds. Patient was encouraged to not talk while walking.   problem 9: out of shape / overweight -recommended Berberine OTC -Weight loss, exercise, and diet control were discussed and are highly encouraged. Treatment options were given such as, aqua therapy, and contacting a nutritionist. Recommended to use the elliptical, stationary bike, less use of treadmill. Mindful eating was explained to the patient Obesity is associated with worsening asthma, shortness of breath, and potential for cardiac disease, diabetes, and other underlying medical conditions  problem 10: health maintenance  - Left Cervical lymph node - U/S  -recommended yearly flu shot 2022  -recommended strep pneumonia vaccines: Prevnar-13 vaccine, followed by Pneumo vaccine 23 one year following after 65 -recommended early intervention for Upper Respiratory Infections (URIs) -recommended regular osteoporosis evaluations -recommended early dermatological evaluations -recommended after the age of 50 to the age of 70, colonoscopy every 5 years  F/U in 6-8 weeks. He is encouraged to call with any changes, concerns, or questions

## 2024-11-05 ENCOUNTER — TRANSCRIPTION ENCOUNTER (OUTPATIENT)
Age: 69
End: 2024-11-05

## 2024-12-02 ENCOUNTER — TRANSCRIPTION ENCOUNTER (OUTPATIENT)
Age: 69
End: 2024-12-02

## 2024-12-03 ENCOUNTER — TRANSCRIPTION ENCOUNTER (OUTPATIENT)
Age: 69
End: 2024-12-03

## 2024-12-09 ENCOUNTER — APPOINTMENT (OUTPATIENT)
Dept: PULMONOLOGY | Facility: CLINIC | Age: 69
End: 2024-12-09
Payer: MEDICARE

## 2024-12-09 VITALS — WEIGHT: 203 LBS | HEIGHT: 71 IN | BODY MASS INDEX: 28.42 KG/M2

## 2024-12-09 DIAGNOSIS — K21.9 GASTRO-ESOPHAGEAL REFLUX DISEASE W/OUT ESOPHAGITIS: ICD-10-CM

## 2024-12-09 DIAGNOSIS — R94.2 ABNORMAL RESULTS OF PULMONARY FUNCTION STUDIES: ICD-10-CM

## 2024-12-09 DIAGNOSIS — G47.33 OBSTRUCTIVE SLEEP APNEA (ADULT) (PEDIATRIC): ICD-10-CM

## 2024-12-09 DIAGNOSIS — R93.89 ABNORMAL FINDINGS ON DIAGNOSTIC IMAGING OF OTHER SPECIFIED BODY STRUCTURES: ICD-10-CM

## 2024-12-09 DIAGNOSIS — R06.02 SHORTNESS OF BREATH: ICD-10-CM

## 2024-12-09 DIAGNOSIS — J45.909 UNSPECIFIED ASTHMA, UNCOMPLICATED: ICD-10-CM

## 2024-12-09 PROCEDURE — 99214 OFFICE O/P EST MOD 30 MIN: CPT

## 2024-12-09 RX ORDER — LEVALBUTEROL TARTRATE 45 UG/1
45 AEROSOL, METERED ORAL
Qty: 1 | Refills: 3 | Status: ACTIVE | COMMUNITY
Start: 2024-12-09 | End: 1900-01-01

## 2024-12-09 RX ORDER — PREDNISONE 10 MG/1
10 TABLET ORAL
Qty: 50 | Refills: 0 | Status: ACTIVE | COMMUNITY
Start: 2024-12-09 | End: 1900-01-01

## 2024-12-09 RX ORDER — AZITHROMYCIN 500 MG/1
500 TABLET, FILM COATED ORAL DAILY
Qty: 7 | Refills: 0 | Status: ACTIVE | COMMUNITY
Start: 2024-12-09 | End: 1900-01-01

## 2024-12-09 RX ORDER — PROMETHAZINE HYDROCHLORIDE AND DEXTROMETHORPHAN HYDROBROMIDE ORAL SOLUTION 15; 6.25 MG/5ML; MG/5ML
6.25-15 SOLUTION ORAL
Qty: 473 | Refills: 1 | Status: ACTIVE | COMMUNITY
Start: 2024-12-09 | End: 1900-01-01

## 2025-02-18 ENCOUNTER — NON-APPOINTMENT (OUTPATIENT)
Age: 70
End: 2025-02-18

## 2025-02-18 ENCOUNTER — APPOINTMENT (OUTPATIENT)
Dept: FAMILY MEDICINE | Facility: CLINIC | Age: 70
End: 2025-02-18
Payer: MEDICARE

## 2025-02-18 DIAGNOSIS — I10 ESSENTIAL (PRIMARY) HYPERTENSION: ICD-10-CM

## 2025-02-18 DIAGNOSIS — J11.1 INFLUENZA DUE TO UNIDENTIFIED INFLUENZA VIRUS WITH OTHER RESPIRATORY MANIFESTATIONS: ICD-10-CM

## 2025-02-18 PROCEDURE — 99204 OFFICE O/P NEW MOD 45 MIN: CPT | Mod: 2W

## 2025-02-18 PROCEDURE — G2211 COMPLEX E/M VISIT ADD ON: CPT | Mod: 2W

## 2025-02-18 RX ORDER — OSELTAMIVIR PHOSPHATE 75 MG/1
75 CAPSULE ORAL TWICE DAILY
Qty: 10 | Refills: 0 | Status: ACTIVE | COMMUNITY
Start: 2025-02-18 | End: 1900-01-01

## 2025-02-18 RX ORDER — BENZONATATE 200 MG/1
200 CAPSULE ORAL 3 TIMES DAILY
Qty: 60 | Refills: 0 | Status: ACTIVE | COMMUNITY
Start: 2025-02-18 | End: 1900-01-01

## 2025-02-21 ENCOUNTER — APPOINTMENT (OUTPATIENT)
Dept: FAMILY MEDICINE | Facility: CLINIC | Age: 70
End: 2025-02-21

## 2025-02-21 ENCOUNTER — TRANSCRIPTION ENCOUNTER (OUTPATIENT)
Age: 70
End: 2025-02-21

## 2025-02-21 DIAGNOSIS — J39.2 OTHER DISEASES OF PHARYNX: ICD-10-CM

## 2025-02-21 DIAGNOSIS — J06.9 ACUTE UPPER RESPIRATORY INFECTION, UNSPECIFIED: ICD-10-CM

## 2025-02-21 DIAGNOSIS — R05.3 CHRONIC COUGH: ICD-10-CM

## 2025-02-21 PROCEDURE — 99214 OFFICE O/P EST MOD 30 MIN: CPT | Mod: 2W

## 2025-02-21 RX ORDER — PREDNISONE 10 MG/1
10 TABLET ORAL
Qty: 21 | Refills: 1 | Status: ACTIVE | COMMUNITY
Start: 2025-02-21 | End: 1900-01-01

## 2025-02-21 RX ORDER — AZITHROMYCIN 250 MG/1
250 TABLET, FILM COATED ORAL
Qty: 1 | Refills: 0 | Status: ACTIVE | COMMUNITY
Start: 2025-02-21 | End: 1900-01-01

## 2025-02-22 PROBLEM — J11.1 FLU: Status: ACTIVE | Noted: 2025-02-18 | Resolved: 2025-03-20

## 2025-05-05 ENCOUNTER — NON-APPOINTMENT (OUTPATIENT)
Age: 70
End: 2025-05-05

## 2025-05-06 ENCOUNTER — APPOINTMENT (OUTPATIENT)
Dept: PULMONOLOGY | Facility: CLINIC | Age: 70
End: 2025-05-06
Payer: MEDICARE

## 2025-05-06 VITALS
HEART RATE: 72 BPM | OXYGEN SATURATION: 97 % | DIASTOLIC BLOOD PRESSURE: 72 MMHG | WEIGHT: 209 LBS | SYSTOLIC BLOOD PRESSURE: 144 MMHG | RESPIRATION RATE: 16 BRPM | HEIGHT: 71 IN | BODY MASS INDEX: 29.26 KG/M2 | TEMPERATURE: 97.3 F

## 2025-05-06 DIAGNOSIS — K21.9 GASTRO-ESOPHAGEAL REFLUX DISEASE W/OUT ESOPHAGITIS: ICD-10-CM

## 2025-05-06 DIAGNOSIS — R94.2 ABNORMAL RESULTS OF PULMONARY FUNCTION STUDIES: ICD-10-CM

## 2025-05-06 DIAGNOSIS — R06.02 SHORTNESS OF BREATH: ICD-10-CM

## 2025-05-06 DIAGNOSIS — G47.33 OBSTRUCTIVE SLEEP APNEA (ADULT) (PEDIATRIC): ICD-10-CM

## 2025-05-06 DIAGNOSIS — R93.89 ABNORMAL FINDINGS ON DIAGNOSTIC IMAGING OF OTHER SPECIFIED BODY STRUCTURES: ICD-10-CM

## 2025-05-06 DIAGNOSIS — J45.909 UNSPECIFIED ASTHMA, UNCOMPLICATED: ICD-10-CM

## 2025-05-06 DIAGNOSIS — R76.8 OTHER SPECIFIED ABNORMAL IMMUNOLOGICAL FINDINGS IN SERUM: ICD-10-CM

## 2025-05-06 PROCEDURE — 94729 DIFFUSING CAPACITY: CPT

## 2025-05-06 PROCEDURE — 95012 NITRIC OXIDE EXP GAS DETER: CPT

## 2025-05-06 PROCEDURE — 94010 BREATHING CAPACITY TEST: CPT

## 2025-05-06 PROCEDURE — 94727 GAS DIL/WSHOT DETER LNG VOL: CPT

## 2025-05-06 PROCEDURE — 99214 OFFICE O/P EST MOD 30 MIN: CPT | Mod: 25

## 2025-07-01 ENCOUNTER — NON-APPOINTMENT (OUTPATIENT)
Age: 70
End: 2025-07-01

## 2025-07-02 ENCOUNTER — APPOINTMENT (OUTPATIENT)
Dept: ORTHOPEDIC SURGERY | Facility: CLINIC | Age: 70
End: 2025-07-02
Payer: MEDICARE

## 2025-07-02 ENCOUNTER — NON-APPOINTMENT (OUTPATIENT)
Age: 70
End: 2025-07-02

## 2025-07-02 VITALS — BODY MASS INDEX: 28.28 KG/M2 | HEIGHT: 71 IN | WEIGHT: 202 LBS

## 2025-07-02 PROBLEM — M77.12 LATERAL EPICONDYLITIS OF LEFT ELBOW: Status: ACTIVE | Noted: 2025-07-02

## 2025-07-02 PROBLEM — M17.11 PRIMARY OSTEOARTHRITIS OF RIGHT KNEE: Status: ACTIVE | Noted: 2025-07-02

## 2025-07-02 PROCEDURE — 73564 X-RAY EXAM KNEE 4 OR MORE: CPT | Mod: RT

## 2025-07-02 PROCEDURE — 99204 OFFICE O/P NEW MOD 45 MIN: CPT

## 2025-08-12 ENCOUNTER — APPOINTMENT (OUTPATIENT)
Dept: CT IMAGING | Facility: HOSPITAL | Age: 70
End: 2025-08-12

## 2025-08-26 ENCOUNTER — OUTPATIENT (OUTPATIENT)
Dept: OUTPATIENT SERVICES | Facility: HOSPITAL | Age: 70
LOS: 1 days | End: 2025-08-26
Payer: MEDICARE

## 2025-08-26 ENCOUNTER — APPOINTMENT (OUTPATIENT)
Dept: CT IMAGING | Facility: HOSPITAL | Age: 70
End: 2025-08-26

## 2025-08-26 DIAGNOSIS — R93.89 ABNORMAL FINDINGS ON DIAGNOSTIC IMAGING OF OTHER SPECIFIED BODY STRUCTURES: ICD-10-CM

## 2025-08-26 PROCEDURE — 71250 CT THORAX DX C-: CPT

## 2025-08-26 PROCEDURE — 71250 CT THORAX DX C-: CPT | Mod: 26

## 2025-09-04 ENCOUNTER — APPOINTMENT (OUTPATIENT)
Dept: ORTHOPEDIC SURGERY | Facility: CLINIC | Age: 70
End: 2025-09-04
Payer: OTHER MISCELLANEOUS

## 2025-09-04 VITALS — WEIGHT: 201 LBS | HEIGHT: 71 IN | BODY MASS INDEX: 28.14 KG/M2

## 2025-09-04 DIAGNOSIS — S80.02XA CONTUSION OF LEFT KNEE, INITIAL ENCOUNTER: ICD-10-CM

## 2025-09-04 DIAGNOSIS — S50.02XA CONTUSION OF LEFT ELBOW, INITIAL ENCOUNTER: ICD-10-CM

## 2025-09-04 DIAGNOSIS — M17.12 UNILATERAL PRIMARY OSTEOARTHRITIS, LEFT KNEE: ICD-10-CM

## 2025-09-04 PROCEDURE — 99205 OFFICE O/P NEW HI 60 MIN: CPT

## 2025-09-04 PROCEDURE — 73564 X-RAY EXAM KNEE 4 OR MORE: CPT | Mod: LT

## 2025-09-08 ENCOUNTER — TRANSCRIPTION ENCOUNTER (OUTPATIENT)
Age: 70
End: 2025-09-08